# Patient Record
Sex: FEMALE | Race: WHITE | NOT HISPANIC OR LATINO | Employment: OTHER | URBAN - METROPOLITAN AREA
[De-identification: names, ages, dates, MRNs, and addresses within clinical notes are randomized per-mention and may not be internally consistent; named-entity substitution may affect disease eponyms.]

---

## 2018-03-27 ENCOUNTER — OFFICE VISIT (OUTPATIENT)
Dept: ORTHOPEDIC SURGERY | Facility: CLINIC | Age: 68
End: 2018-03-27

## 2018-03-27 VITALS
HEART RATE: 78 BPM | WEIGHT: 179.9 LBS | HEIGHT: 63 IN | BODY MASS INDEX: 31.88 KG/M2 | SYSTOLIC BLOOD PRESSURE: 149 MMHG | DIASTOLIC BLOOD PRESSURE: 74 MMHG

## 2018-03-27 DIAGNOSIS — M72.2 PLANTAR FASCIAL FIBROMATOSIS OF RIGHT FOOT: ICD-10-CM

## 2018-03-27 DIAGNOSIS — M72.2 PLANTAR FASCIITIS: Primary | ICD-10-CM

## 2018-03-27 PROCEDURE — 99203 OFFICE O/P NEW LOW 30 MIN: CPT | Performed by: ORTHOPAEDIC SURGERY

## 2018-03-27 RX ORDER — LEVOTHYROXINE SODIUM 112 UG/1
112 TABLET ORAL DAILY
COMMUNITY

## 2018-03-27 RX ORDER — SIMVASTATIN 40 MG
TABLET ORAL
COMMUNITY
Start: 2018-01-30 | End: 2023-02-21

## 2018-03-27 RX ORDER — COVID-19 ANTIGEN TEST
440 KIT MISCELLANEOUS
COMMUNITY
End: 2023-02-20

## 2018-03-27 NOTE — PROGRESS NOTES
NEW PATIENT    Patient: Renae Millna  : 1950    Primary Care Provider: Ender Dixon MD    Requesting Provider: As above    Pain of the Left Foot and Pain of the Right Foot      History    Chief Complaint: Bilateral heel pain, right forefoot pain    History of Present Illness: This is an extremely pleasant 68-year-old woman with a greater than one-year history of bilateral heel pain.  The heel pain is worse in the morning and on arising from a seated position and gets worse throughout the day.  She has tried some stretching, using a towel, she has not had a night splint.  She reports the left is worse than the right.  She reports the pain is 7-8 out of 10, sharp and aching.  She did have some acupuncture which helped for a few days.  A second problem is that she recently noted a nodule on the plantar surface of the right foot in the plantar fascia.  It was large initially but now is about 8 mm, it's tender to palpation, it's not as painful as it first appeared a month or so ago.  No history of injury.  She does not know if anyone in her family with similar nodules, she does not know if anyone with Dupuytren's.  She works as a bank  and patellar, has much more pain when she is on her feet all day.    No current outpatient prescriptions on file prior to visit.     No current facility-administered medications on file prior to visit.       Allergies   Allergen Reactions   • Latex Itching     Swelling        History reviewed. No pertinent past medical history.  Past Surgical History:   Procedure Laterality Date   • CERVICAL FUSION     • TUBAL ABDOMINAL LIGATION       Family History   Problem Relation Age of Onset   • Cancer Mother    • Diabetes Mother    • No Known Problems Father       Social History     Social History   • Marital status:      Spouse name: N/A   • Number of children: N/A   • Years of education: N/A     Occupational History   • Not on file.     Social History Main Topics   •  "Smoking status: Never Smoker   • Smokeless tobacco: Never Used   • Alcohol use No   • Drug use: No   • Sexual activity: Defer     Other Topics Concern   • Not on file     Social History Narrative   • No narrative on file        Review of Systems   Constitutional: Positive for fatigue.   HENT: Positive for tinnitus.    Eyes: Negative.    Respiratory: Positive for cough.    Cardiovascular: Negative.    Gastrointestinal: Negative.    Endocrine: Negative.    Genitourinary: Negative.    Musculoskeletal: Positive for arthralgias and back pain.   Skin: Negative.    Allergic/Immunologic: Negative.    Neurological: Positive for dizziness.   Hematological: Negative.    Psychiatric/Behavioral: Negative.        The following portions of the patient's history were reviewed and updated as appropriate: allergies, current medications, past family history, past medical history, past social history, past surgical history and problem list.    Physical Exam:   /74   Pulse 78   Ht 159.4 cm (62.75\")   Wt 81.6 kg (179 lb 14.3 oz)   BMI 32.12 kg/m²   GENERAL: Body habitus: overweight    Lower extremity edema: Left: none; Right: none    Varicose veins:  Left: mild; Right: mild    Gait: Antalgic with the first few steps     Mental Status:  awake and alert; oriented to person, place, and time    Voice:  clear  SKIN:  Normal and warm and dry    Hair Growth:  Right:normal; Left:  normal  NAILS: Toenails: Right great toenail is somewhat thickened and irregular, otherwise unremarkable  HEENT: Head: Normocephalic, atraumatic,  without obvious abnormality.  eye: normal external eye, no icterus  ears: normal external ears  nose: normal external nose  pharynx: dental hygiene adequate  PULM:  Repiratory effort normal  CV:  Dorsalis Pedis:  Right: 2+; Left:2+    Posterior Tibial: Right:2+; Left:2+    Capillary Refill:  Brisk  MSK:  Hand:left handed and mild arthritis, Heberden's nodes      Tibia:  Right:  non tender; Left:  non " "tender      Ankle:  Right: non tender, ROM  normal and symmetric and motor function  normal; Left:  non tender, ROM  normal and symmetric and motor function  normal      Foot:  Right:  She is very tender to palpation at the origin of the plantar fascia.  She also has a tender nodule that is in the plantar fascia, it's tender to palpation no other tenderness, normal range of motion; Left:  Very tender to palpation the origin the plantar fascia, no masses in the plantar fascia, otherwise nontender      NEURO: Heel Walking:  Right:  painful; Left:  painful    Toe Walking:  Right:  normal; Left:  normal     Du Quoin-Malachi 5.07 monofilament test: normal    Lower extremity sensation: intact     Reflexes:  Biceps:  Right:  1+; Left:  1+           Quads:  Right:  2+; Left:  2+           Ankle:  Right:  1+; Left:  1+      Calf Atrophy:none    Motor Function: all 5/5         Medical Decision Making    Data Review:   reviewed outside records    Assessment and Plan/ Diagnosis/Treatment options:   1. Plantar fasciitis  She definitely has plantar fasciitis bilaterally.  I explained plantar fasciitis in detail to her.  Diagnosis: plantar fasciitis:  I explained plantar fasciitis in detail to the patient.  I explained to the bow-string mechanism of the plantar fascia.  I went over the current research of the [employer] with them.  I explained the treatments that were not statistically significant in improving the problem including: injection of steroids, special orthotics, special shoes, surgery, medication, ice, not working, etc.    I explained the treatments that are statistically significant at improving the problem.  These include stretching/night splinting, castingI explained the most important treatment: Stretching and night splinting.  I went over the patient information sheet with them, I showed them the stretches and they were able to reproduce them.  I emphasized the \"toe pull\" as the " "most important stretch.  They need to do the stretches 5 repetitions each, 6-8 times per day.  I explained how to do them at work, at home, before getting out of bed, before getting out of the car, and before getting up from a chair.    We provided them with a night splint.  I recommend that she uses on alternating feet at night, I would not recommend 2 night splints    If they do not improve after 4 months of aggressive stretching and night splinting, the next step will be a short leg fiberglass walking cast worn for 6 weeks.    · Preprinted education was provided to the patient.  .  She also has some mild edema by report, we talked about support stockings    2. Plantar fascial fibromatosis of right foot  I explained plantar fibromatosis to the patient in detail.  It is a benign fibrous tumor in the plantar fascia.  It is best treated non-operatively as the recurrence rate after surgery is >50%.  It is more common in people of northern  descent and in diabetes.  It can be associated with  Dupuytren's in the palm, and Peyronie's in the male genitals.  It does not cause contracture in the foot.  It can be painful, especially in the developing phase.  However, the pain almost always resolves over time and it becomes a painless bump.      Custom orthotics with a \"dent\" in the area of the nodule help take the pressure off it.       I gave her prescription for custom orthotics, I emphasized that this will help take pressure off of fibroma, but will not cure plantar fasciitis.  Only stretching and the night splint will help the plantar fasciitis.                        "

## 2018-03-28 PROBLEM — M72.2 PLANTAR FASCIITIS: Status: ACTIVE | Noted: 2018-03-28

## 2018-03-28 PROBLEM — M72.2 PLANTAR FASCIAL FIBROMATOSIS OF RIGHT FOOT: Status: ACTIVE | Noted: 2018-03-28

## 2018-04-18 ENCOUNTER — HOSPITAL ENCOUNTER (OUTPATIENT)
Age: 68
End: 2018-04-18
Payer: COMMERCIAL

## 2018-04-18 DIAGNOSIS — M79.672: ICD-10-CM

## 2018-04-18 DIAGNOSIS — M79.671: Primary | ICD-10-CM

## 2018-04-19 ENCOUNTER — HOSPITAL ENCOUNTER (OUTPATIENT)
Age: 68
End: 2018-04-19
Payer: COMMERCIAL

## 2018-04-19 DIAGNOSIS — B35.1: ICD-10-CM

## 2018-04-19 DIAGNOSIS — M79.673: Primary | ICD-10-CM

## 2018-04-19 PROCEDURE — 87102 FUNGUS ISOLATION CULTURE: CPT

## 2018-04-19 PROCEDURE — 73630 X-RAY EXAM OF FOOT: CPT

## 2018-04-19 PROCEDURE — 87220 TISSUE EXAM FOR FUNGI: CPT

## 2018-04-19 PROCEDURE — 87206 SMEAR FLUORESCENT/ACID STAI: CPT

## 2018-07-25 ENCOUNTER — HOSPITAL ENCOUNTER (OUTPATIENT)
Age: 68
End: 2018-07-25
Payer: COMMERCIAL

## 2018-07-25 DIAGNOSIS — R10.84: Primary | ICD-10-CM

## 2018-07-25 LAB
BUN SERPL-MCNC: 9 MG/DL (ref 7–18)
CREAT BLD-SCNC: 0.78 MG/DL (ref 0.55–1.02)
ESTIMATED GLOMERULAR FILT RATE: 73 ML/MIN (ref 60–?)
GFR (AFRICAN AMERICAN): 89 ML/MIN (ref 60–?)

## 2018-07-25 PROCEDURE — 82565 ASSAY OF CREATININE: CPT

## 2018-07-25 PROCEDURE — 84520 ASSAY OF UREA NITROGEN: CPT

## 2018-07-25 PROCEDURE — 36415 COLL VENOUS BLD VENIPUNCTURE: CPT

## 2018-07-26 ENCOUNTER — HOSPITAL ENCOUNTER (OUTPATIENT)
Age: 68
End: 2018-07-26
Payer: COMMERCIAL

## 2018-07-26 DIAGNOSIS — R10.84: Primary | ICD-10-CM

## 2018-07-26 PROCEDURE — 74170 CT ABD WO CNTRST FLWD CNTRST: CPT

## 2018-09-19 ENCOUNTER — OFFICE VISIT (OUTPATIENT)
Dept: ORTHOPEDIC SURGERY | Facility: CLINIC | Age: 68
End: 2018-09-19

## 2018-09-19 VITALS — HEIGHT: 63 IN | OXYGEN SATURATION: 97 % | WEIGHT: 169.75 LBS | HEART RATE: 83 BPM | BODY MASS INDEX: 30.08 KG/M2

## 2018-09-19 DIAGNOSIS — M72.2 PLANTAR FASCIITIS: ICD-10-CM

## 2018-09-19 DIAGNOSIS — M72.2 PLANTAR FASCIAL FIBROMATOSIS OF RIGHT FOOT: Primary | ICD-10-CM

## 2018-09-19 PROCEDURE — 99212 OFFICE O/P EST SF 10 MIN: CPT | Performed by: ORTHOPAEDIC SURGERY

## 2018-09-19 NOTE — PROGRESS NOTES
"ESTABLISHED PATIENT    Patient: Renae Millan  : 1950    Primary Care Provider: Ender Dixon MD    Requesting Provider: As above    Follow-up (4 month f/u bilateral Plantar fasciitis)      History    Chief Complaint: bilateral heel pain, and plantar fibromatosis    History of Present Illness: she returns reporting her heel pain has resolved, only an occasional twinge in the morning.  She got the orthotics for the plantar fibromatosis.  She reports she had an xray recently and it showed a \"heel spur \" on the left, I again explained that the spur is not the source of pain.  The plantar fascia was the problem.      Current Outpatient Prescriptions on File Prior to Visit   Medication Sig Dispense Refill   • Conj Estrogens-Bazedoxifene (DUAVEE) 0.45-20 MG tablet Take  by mouth Every Other Day.     • levothyroxine (SYNTHROID, LEVOTHROID) 112 MCG tablet Take 112 mcg by mouth Daily.     • magnesium chloride ER (MAG-DELAY) 535 (64 Mg) MG CR tablet Take 128 mg by mouth Daily.     • Naproxen Sodium (ALEVE) 220 MG capsule Take 440 mg by mouth every night at bedtime.     • Probiotic Product (SOLUBLE FIBER/PROBIOTICS PO) Take  by mouth Daily.     • simvastatin (ZOCOR) 40 MG tablet        No current facility-administered medications on file prior to visit.       Allergies   Allergen Reactions   • Latex Itching     Swelling        History reviewed. No pertinent past medical history.  Past Surgical History:   Procedure Laterality Date   • CERVICAL FUSION     • TUBAL ABDOMINAL LIGATION       Family History   Problem Relation Age of Onset   • Cancer Mother    • Diabetes Mother    • No Known Problems Father       Social History     Social History   • Marital status:      Spouse name: N/A   • Number of children: N/A   • Years of education: N/A     Occupational History   • Not on file.     Social History Main Topics   • Smoking status: Never Smoker   • Smokeless tobacco: Never Used   • Alcohol use No   • Drug use: No   • " "Sexual activity: Defer     Other Topics Concern   • Not on file     Social History Narrative   • No narrative on file        Review of Systems   Constitutional: Negative.    HENT: Negative.    Eyes: Negative.    Respiratory: Negative.    Cardiovascular: Negative.    Gastrointestinal: Negative.    Endocrine: Negative.    Genitourinary: Negative.    Musculoskeletal: Positive for arthralgias (foot pain).   Skin: Negative.    Allergic/Immunologic: Negative.    Neurological: Negative.    Hematological: Negative.    Psychiatric/Behavioral: Negative.        The following portions of the patient's history were reviewed and updated as appropriate: allergies, current medications, past family history, past medical history, past social history, past surgical history and problem list.    Physical Exam:   Pulse 83   Ht 159.4 cm (62.76\")   Wt 77 kg (169 lb 12.1 oz)   SpO2 97%   BMI 30.30 kg/m²   GENERAL: Body habitus: overweight    Lower extremity edema: Left: none; Right: none    Gait: normal     Mental Status:  awake and alert; oriented to person, place, and time  MSK:  Tibia:  Right:  non tender; Left:  non tender        Ankle:  Right: non tender; Left:  non tender        Foot:  Right:  non tender, no change in small plantar fibroma; Left:  non tender    NEURO Sensation:  intact     Medical Decision Making    Data Review:   none    Assessment/Plan/Diagnosis/Treatment Options:   1. Plantar fascial fibromatosis of right foot  Not painful, she has the orthotics    2. Plantar fasciitis  Much improved, she should [id number] stretching, I will see her as needed                            "

## 2018-12-07 ENCOUNTER — LAB REQUISITION (OUTPATIENT)
Dept: LAB | Facility: HOSPITAL | Age: 68
End: 2018-12-07

## 2018-12-07 DIAGNOSIS — Z00.00 ROUTINE GENERAL MEDICAL EXAMINATION AT A HEALTH CARE FACILITY: ICD-10-CM

## 2018-12-07 PROCEDURE — 36415 COLL VENOUS BLD VENIPUNCTURE: CPT

## 2019-03-14 ENCOUNTER — LAB REQUISITION (OUTPATIENT)
Dept: LAB | Facility: HOSPITAL | Age: 69
End: 2019-03-14

## 2019-03-14 DIAGNOSIS — Z00.00 ROUTINE GENERAL MEDICAL EXAMINATION AT A HEALTH CARE FACILITY: ICD-10-CM

## 2019-03-14 PROCEDURE — 36415 COLL VENOUS BLD VENIPUNCTURE: CPT

## 2019-05-30 ENCOUNTER — HOSPITAL ENCOUNTER (OUTPATIENT)
Age: 69
End: 2019-05-30
Payer: COMMERCIAL

## 2019-05-30 DIAGNOSIS — Z85.820: ICD-10-CM

## 2019-05-30 DIAGNOSIS — R10.9: ICD-10-CM

## 2019-05-30 DIAGNOSIS — E04.1: ICD-10-CM

## 2019-05-30 DIAGNOSIS — R07.81: Primary | ICD-10-CM

## 2019-05-30 DIAGNOSIS — N63.0: ICD-10-CM

## 2019-05-30 DIAGNOSIS — D73.89: ICD-10-CM

## 2019-05-30 PROCEDURE — 71250 CT THORAX DX C-: CPT

## 2019-06-04 ENCOUNTER — HOSPITAL ENCOUNTER (OUTPATIENT)
Age: 69
End: 2019-06-04
Payer: COMMERCIAL

## 2019-06-04 DIAGNOSIS — R06.02: Primary | ICD-10-CM

## 2019-06-04 PROCEDURE — 71046 X-RAY EXAM CHEST 2 VIEWS: CPT

## 2019-06-17 ENCOUNTER — HOSPITAL ENCOUNTER (OUTPATIENT)
Age: 69
End: 2019-06-17
Payer: COMMERCIAL

## 2019-06-17 DIAGNOSIS — D73.9: Primary | ICD-10-CM

## 2019-06-17 PROCEDURE — 74183 MRI ABD W/O CNTR FLWD CNTR: CPT

## 2019-06-17 PROCEDURE — A9576 INJ PROHANCE MULTIPACK: HCPCS

## 2021-02-04 ENCOUNTER — HOSPITAL ENCOUNTER (OUTPATIENT)
Age: 71
End: 2021-02-04
Payer: COMMERCIAL

## 2021-02-04 DIAGNOSIS — R10.9: Primary | ICD-10-CM

## 2021-02-04 LAB
BUN SERPL-MCNC: 12 MG/DL (ref 7–17)
CREAT BLD-SCNC: 0.9 MG/DL (ref 0.52–1.04)
ESTIMATED GLOMERULAR FILT RATE: 62 ML/MIN (ref 60–?)
GFR (AFRICAN AMERICAN): 75 ML/MIN (ref 60–?)

## 2021-02-04 PROCEDURE — 82565 ASSAY OF CREATININE: CPT

## 2021-02-04 PROCEDURE — 36415 COLL VENOUS BLD VENIPUNCTURE: CPT

## 2021-02-04 PROCEDURE — 84520 ASSAY OF UREA NITROGEN: CPT

## 2021-02-05 ENCOUNTER — HOSPITAL ENCOUNTER (OUTPATIENT)
Age: 71
End: 2021-02-05
Payer: COMMERCIAL

## 2021-02-05 DIAGNOSIS — R10.9: Primary | ICD-10-CM

## 2021-02-05 PROCEDURE — 74177 CT ABD & PELVIS W/CONTRAST: CPT

## 2022-06-01 ENCOUNTER — HOSPITAL ENCOUNTER (OUTPATIENT)
Age: 72
End: 2022-06-01
Payer: MEDICARE

## 2022-06-01 DIAGNOSIS — M79.672: Primary | ICD-10-CM

## 2022-06-01 PROCEDURE — 73630 X-RAY EXAM OF FOOT: CPT

## 2022-06-27 ENCOUNTER — HOSPITAL ENCOUNTER (OUTPATIENT)
Dept: HOSPITAL 22 - OT | Age: 72
Discharge: HOME | End: 2022-06-27
Payer: MEDICARE

## 2022-06-27 DIAGNOSIS — G56.01: Primary | ICD-10-CM

## 2022-06-27 PROCEDURE — 97014 ELECTRIC STIMULATION THERAPY: CPT

## 2022-06-27 PROCEDURE — 97530 THERAPEUTIC ACTIVITIES: CPT

## 2022-06-27 PROCEDURE — 97010 HOT OR COLD PACKS THERAPY: CPT

## 2022-06-27 PROCEDURE — 97140 MANUAL THERAPY 1/> REGIONS: CPT

## 2022-06-27 PROCEDURE — 97110 THERAPEUTIC EXERCISES: CPT

## 2022-06-27 PROCEDURE — G0283 ELEC STIM OTHER THAN WOUND: HCPCS

## 2022-06-27 PROCEDURE — 97033 APP MDLTY 1+IONTPHRSIS EA 15: CPT

## 2022-06-27 PROCEDURE — 97035 APP MDLTY 1+ULTRASOUND EA 15: CPT

## 2022-06-27 PROCEDURE — 97166 OT EVAL MOD COMPLEX 45 MIN: CPT

## 2022-06-27 PROCEDURE — 97164 PT RE-EVAL EST PLAN CARE: CPT

## 2022-09-29 ENCOUNTER — HOSPITAL ENCOUNTER (OUTPATIENT)
Dept: HOSPITAL 22 - PT | Age: 72
Discharge: HOME | End: 2022-09-29
Payer: MEDICARE

## 2022-09-29 DIAGNOSIS — R42: Primary | ICD-10-CM

## 2022-09-29 PROCEDURE — 97163 PT EVAL HIGH COMPLEX 45 MIN: CPT

## 2022-09-29 PROCEDURE — 97530 THERAPEUTIC ACTIVITIES: CPT

## 2022-09-29 PROCEDURE — 97110 THERAPEUTIC EXERCISES: CPT

## 2022-09-29 PROCEDURE — 97112 NEUROMUSCULAR REEDUCATION: CPT

## 2022-09-29 PROCEDURE — 97140 MANUAL THERAPY 1/> REGIONS: CPT

## 2022-09-30 DIAGNOSIS — Z12.11 COLON CANCER SCREENING: Primary | ICD-10-CM

## 2022-10-03 ENCOUNTER — PRIOR AUTHORIZATION (OUTPATIENT)
Dept: GASTROENTEROLOGY | Facility: CLINIC | Age: 72
End: 2022-10-03

## 2022-10-20 ENCOUNTER — OUTSIDE FACILITY SERVICE (OUTPATIENT)
Dept: GASTROENTEROLOGY | Facility: CLINIC | Age: 72
End: 2022-10-20

## 2022-10-20 PROCEDURE — 45385 COLONOSCOPY W/LESION REMOVAL: CPT | Performed by: INTERNAL MEDICINE

## 2022-10-20 PROCEDURE — 88305 TISSUE EXAM BY PATHOLOGIST: CPT | Performed by: INTERNAL MEDICINE

## 2022-10-20 PROCEDURE — G0500 MOD SEDAT ENDO SERVICE >5YRS: HCPCS | Performed by: INTERNAL MEDICINE

## 2022-10-21 ENCOUNTER — LAB REQUISITION (OUTPATIENT)
Dept: LAB | Facility: HOSPITAL | Age: 72
End: 2022-10-21

## 2022-10-21 DIAGNOSIS — Z12.11 ENCOUNTER FOR SCREENING FOR MALIGNANT NEOPLASM OF COLON: ICD-10-CM

## 2022-10-24 ENCOUNTER — TELEPHONE (OUTPATIENT)
Dept: GASTROENTEROLOGY | Facility: CLINIC | Age: 72
End: 2022-10-24

## 2022-10-24 LAB
CYTO UR: NORMAL
LAB AP CASE REPORT: NORMAL
LAB AP CLINICAL INFORMATION: NORMAL
PATH REPORT.FINAL DX SPEC: NORMAL
PATH REPORT.GROSS SPEC: NORMAL

## 2022-10-24 NOTE — TELEPHONE ENCOUNTER
----- Message from Ke Evangelista MD sent at 10/24/2022 10:41 AM EDT -----  Please let Renae know she had an adenoma. Follow up in 5 years is recommended

## 2023-01-19 ENCOUNTER — HOSPITAL ENCOUNTER (OUTPATIENT)
Age: 73
End: 2023-01-19
Payer: MEDICARE

## 2023-01-19 DIAGNOSIS — G89.29: ICD-10-CM

## 2023-01-19 DIAGNOSIS — M54.41: ICD-10-CM

## 2023-01-19 DIAGNOSIS — M54.42: Primary | ICD-10-CM

## 2023-01-19 PROCEDURE — 72131 CT LUMBAR SPINE W/O DYE: CPT

## 2023-02-13 ENCOUNTER — HOSPITAL ENCOUNTER (OUTPATIENT)
Age: 73
End: 2023-02-13
Payer: MEDICARE

## 2023-02-13 DIAGNOSIS — M54.50: Primary | ICD-10-CM

## 2023-02-13 PROCEDURE — 76376 3D RENDER W/INTRP POSTPROCES: CPT

## 2023-02-13 PROCEDURE — 72110 X-RAY EXAM L-2 SPINE 4/>VWS: CPT

## 2023-02-13 PROCEDURE — 72148 MRI LUMBAR SPINE W/O DYE: CPT

## 2023-02-16 DIAGNOSIS — M43.16 SPONDYLOLISTHESIS OF LUMBAR REGION: Primary | ICD-10-CM

## 2023-02-17 DIAGNOSIS — M79.605 BILATERAL LOWER EXTREMITY PAIN: Primary | ICD-10-CM

## 2023-02-17 DIAGNOSIS — M79.604 BILATERAL LOWER EXTREMITY PAIN: Primary | ICD-10-CM

## 2023-02-20 ENCOUNTER — HOSPITAL ENCOUNTER (OUTPATIENT)
Dept: GENERAL RADIOLOGY | Facility: HOSPITAL | Age: 73
Discharge: HOME | End: 2023-02-20
Admitting: ANESTHESIOLOGY
Payer: MEDICARE

## 2023-02-20 DIAGNOSIS — M43.16 SPONDYLOLISTHESIS OF LUMBAR REGION: ICD-10-CM

## 2023-02-20 PROCEDURE — 72120 X-RAY BEND ONLY L-S SPINE: CPT

## 2023-02-20 RX ORDER — LEVOTHYROXINE SODIUM 112 UG/1
1 TABLET ORAL EVERY 24 HOURS
COMMUNITY
End: 2023-02-21

## 2023-02-20 RX ORDER — MECLIZINE HYDROCHLORIDE 25 MG/1
1 TABLET ORAL EVERY 8 HOURS
COMMUNITY

## 2023-02-20 RX ORDER — SIMVASTATIN 20 MG
TABLET ORAL
COMMUNITY
Start: 2023-02-07

## 2023-02-20 RX ORDER — MELOXICAM 15 MG/1
TABLET ORAL
COMMUNITY
Start: 2022-11-23

## 2023-02-20 RX ORDER — ESTRADIOL 10 UG/1
1 INSERT VAGINAL EVERY 24 HOURS
COMMUNITY

## 2023-02-20 RX ORDER — PANTOPRAZOLE SODIUM 20 MG/1
TABLET, DELAYED RELEASE ORAL
COMMUNITY
Start: 2023-02-07

## 2023-02-20 RX ORDER — MULTIPLE VITAMINS W/ MINERALS TAB 9MG-400MCG
TAB ORAL
COMMUNITY

## 2023-02-20 NOTE — PROGRESS NOTES
"Chief Complaint: \"Lower back pain and leg pain\"        History of Present Illness:   Patient: Ms. Renae Millan, 73 y.o. female   Referring Physician: Self-referral  Reason for Referral: Consultation for chronic intractable lower back and lower extremity pain pain.   Pain History: Patient reports a 6-month history of chronic progressive lower back and bilateral lower extremity pain, right worse than left, which began without incident. No preceding injury or trauma.  Pain has increased over the past 6 months. She was seen at the [facility] in October 2021 with complaints of polyarthralgia. Last year, she underwent consultation with Dr. Robin in rheumatology at the [facility] and diagnostic work-up for autoimmune disorder was unrevealing. She had a positive KIM in 2016. MRI of the lumbar spine without contrast on 2/13/2023 revealed grade 1 anterolisthesis of L3 on L4.  Diffuse lumbar spondylosis most significant from L3-4 through L5-S1 where there are disc bulges, facet hypertrophy, ligamentum flavum hypertrophy contributing to mild to moderate degree of canal and neuroforaminal stenosis, most significant at L3-L4 where there is grade 1 anterolisthesis, disc bulge, degenerative endplate changes, facet hypertrophy.  Left subarticular disc protrusion with mild canal stenosis and moderate lateral recess and left foraminal stenosis and mild right lateral recess and foraminal stenosis.  At L5-S1: Loss of disc height. Mild to moderate bilateral neuroforaminal stenosis, left greater than right. Lumbar x-rays AP, oblique, lateral views on 2/13/2023 revealed grade 1 anterior listhesis of L3 on L4.  Degenerative disc disease more pronounced from L3-4 through L5-S1.  At L5-S1, is most advanced with loss of disc height.  Facet hypertrophy. CT scan of the lumbar spine without contrast on 1/19/2023 revealed grade 1 anterolisthesis of L3 on L4.  Multilevel degenerative disc disease particularly from L3-L4 " through L5-S1 along with hypertrophy of the posterior elements.  Axial imaging: L3-L4: Disc bulge, moderate to severe central canal stenosis and mild to moderate bilateral neuroforaminal stenosis. At L5-S1: Loss of disc height with a broad-based disc osteophyte complex.  Mild canal stenosis.  Moderate bilateral neuroforaminal stenosis. Flexion-extension x-rays of the lumbar spine on 02/20/2023 revealed 6 mm anterolisthesis of L3 on L4 without instability during flexion and extension. Pain has progressed in intensity over the past months. Patient has failed to obtain pain relief with conservative measures for more than 6 months oral analgesics, heat, physical therapy (within the past 6 months), physical therapist directed home exercise program HEP (ongoing), to name a few  Pain Description: Constant lower back pain with intermittent exacerbation, described as aching, burning, dull, sharp, and throbbing sensation.   Radiation of Pain: The pain radiates into the posterior aspect of her thighs and calves and sometimes the dorsum of her feet RT>LT  Pain intensity today: 5/10   Average pain intensity last week: 6/10  Pain intensity ranges from: 4/10 to 8/10  Aggravating factors: Pain increases with squatting, standing, walking. Patient describes neurogenic claudication. Patient does not use a cane or walker  Alleviating factors: Pain decreases with sitting down on a recliner, changing positions  Associated Symptoms:   Patient reports pain, weakness but denies numbness right>left  lower extremities.   Patient denies any new bladder or bowel problems.   Patient reports difficulties with her balance but denies recent falls.   Pain interferes with general activities, and affects patient's quality of life  Pain interferes with sleep causing sleep fragmentation     Review of previous therapies and additional medical records:  Renae Millan has already failed the following measures, including:   Conservative Measures: Oral  analgesics, heat, physical therapy (within the past 6 months), physical therapist directed home exercise program HEP (ongoing),   Interventional Measures: None  Surgical Measures: ACDF Dr Levine 29 years ago. No history of previous lumbar spine or hip surgery  Renae Millan has not undergone orthopedic spine or neurosurgical consultation for chronic pain condition   Renae Millan presents with significant comorbidities including hypothyroidism, HLP  In terms of current analgesics, Renae Millan takes: Tylenol, naproxen  I have reviewed Scot Report consistent with medication reconciliation.  SOAPP: Low Risk     PHQ-2 Depression Screening  Little interest or pleasure in doing things? 0-->not at all   Feeling down, depressed, or hopeless? 0-->not at all   PHQ-2 Total Score 0   Patient screened negative for depression based on a PHQ-2 score of 0 on 02/21/2023    Global Pain Scale 02-21 2023          Pain 16          Feelings 9          Clinical outcomes 11          Activities 0          GPS Total: 36            The [place] Back Pain Disability Scale   DATE 02-21 2023          Sleep through the night 5          Turn over in bed 3          Get out of bed 0          Make your bed 0          Put on socks (pantyhose) 2          Ride in a car 2          Sit in a chair for several hours 5          Stand up for 20-30 minutes 5          Climb one flight of stairs 2          Walk a few blocks (200-300 yards)  3          Walk several miles 2          Run one block (about 50 yards) 5          Take food out of the refrigerator 0          Reach up to high shelves 2          Move a chair 2          Pull or push heavy doors 1          Bend over to clean the bathtub 5          Throw a ball 0          Carry two bags of groceries 0          Lift and carry a heavy suitcase 5          Total score 49            Review of Diagnostic Studies:  I have reviewed and interpreted the images with the patient and used the images and a tridimensional  spine model to explain findings. I have reviewed the report, as well.  Flexion-extension x-rays of the lumbar spine on 02/20/2023 revealed 6 mm anterolisthesis of L3 on L4 without instability during flexion and extension  MRI of the lumbar spine without contrast on 2/13/2023 revealed grade 1 anterolisthesis of L3 on L4.  Vertebral body heights and alignment otherwise are preserved.  The conus medullaris is seen at L1 and has unremarkable appearance.  Axial imaging:  L1-L2: No significant canal or foraminal stenosis  L2-L3: Facet hypertrophy.  No significant canal or foraminal stenosis  L3-L4: Grade 1 anterolisthesis, disc bulge, degenerative endplate changes, facet hypertrophy.  Left subarticular disc protrusion with mild canal stenosis and moderate lateral recess and left foraminal stenosis and mild right lateral recess and foraminal stenosis  L4-L5: Annular disc bulge. Facet hypertrophy.   No significant canal or foraminal stenosis.    L5-S1: Loss of disc height. Rightward broad-based disc osteophyte complex that narrows the right lateral recess, degenerative endplate changes, facet hypertrophy.  Mild to moderate bilateral neuroforaminal stenosis, left greater than right  Lumbar x-rays AP, oblique, lateral views on 2/13/2023 revealed grade 1 anterior listhesis of L3 on L4.  Degenerative disc disease more pronounced from L3-L4 through L5-S1.  At L5-S1, is most advanced with loss of disc height.  Facet hypertrophy  CT scan of the lumbar spine without contrast on 1/19/2023 revealed grade 1 anterolisthesis of L3 on L4.  Multilevel degenerative disc disease particularly from L3-L4 through L5-S1 along with hypertrophy of the posterior elements.  Axial imaging: L3-L4: Disc bulge, moderate to severe central canal stenosis and mild to moderate bilateral neuroforaminal stenosis  L4-L5: Disc bulge, facet hypertrophy.  Mild canal stenosis.  No significant foraminal stenosis  L5-S1: Loss of disc height with a rightward  broad-based disc osteophyte complex.  Mild canal stenosis.  Moderate bilateral neuroforaminal stenosis right greater than left    Review of Systems   Musculoskeletal: Positive for back pain.   Neurological: Positive for weakness.   All other systems reviewed and are negative.        Patient Active Problem List   Diagnosis   • Plantar fasciitis   • Plantar fascial fibromatosis of right foot   • Gait disturbance   • Physical deconditioning   • Degeneration of lumbar or lumbosacral intervertebral disc   • Spondylosis of lumbar region without myelopathy or radiculopathy   • Spondylolisthesis of lumbar region   • Lumbar stenosis with neurogenic claudication       Past Medical History:   Diagnosis Date   • Borderline high cholesterol    • Hypothyroidism    • Melanoma (HCC)     Removed from Left arm         Past Surgical History:   Procedure Laterality Date   • CERVICAL FUSION  06/1994    Dr. Chevy Peoples (C2,C3,C4)   • TUBAL ABDOMINAL LIGATION           Family History   Problem Relation Age of Onset   • Cancer Mother    • Diabetes Mother    • Diabetes Father    • Leukemia Father          Social History     Socioeconomic History   • Marital status:    Tobacco Use   • Smoking status: Never   • Smokeless tobacco: Never   Substance and Sexual Activity   • Alcohol use: No   • Drug use: No   • Sexual activity: Defer           Current Outpatient Medications:   •  Cholecalciferol 25 MCG (1000 UT) capsule, take 1 capsule by oral route  twice a week with Vitamin D3 2,000, Disp: , Rfl:   •  Cholecalciferol 50 MCG (2000 UT) capsule, Take 1 capsule by mouth Daily., Disp: , Rfl:   •  estradiol (VAGIFEM) 10 MCG tablet vaginal tablet, Insert 1 tablet into the vagina Daily., Disp: , Rfl:   •  levothyroxine (SYNTHROID, LEVOTHROID) 112 MCG tablet, Take 112 mcg by mouth Daily., Disp: , Rfl:   •  meclizine (ANTIVERT) 25 MG tablet, Take 1 tablet by mouth Every 8 (Eight) Hours., Disp: , Rfl:   •  meloxicam (MOBIC) 15 MG tablet, ,  "Disp: , Rfl:   •  multivitamin with minerals tablet tablet, Centrum Silver Women 8 mg iron-400 mcg-300 mcg tablet  Take by oral route., Disp: , Rfl:   •  pantoprazole (PROTONIX) 20 MG EC tablet, , Disp: , Rfl:   •  Probiotic Product (SOLUBLE FIBER/PROBIOTICS PO), Take  by mouth Daily., Disp: , Rfl:   •  simvastatin (ZOCOR) 20 MG tablet, , Disp: , Rfl:       Allergies   Allergen Reactions   • Hycodan [Hydrocodone Bit-Homatrop Mbr] Shortness Of Breath   • Latex Swelling     Pt reports experienced \"years ago\".           /86   Pulse 85   Temp 95.9 °F (35.5 °C)   Ht 157.5 cm (62\")   Wt 72.9 kg (160 lb 12.8 oz)   SpO2 96%   BMI 29.41 kg/m²       Physical Exam:  Constitutional: Patient appears well-developed, well-nourished, well-hydrated, appears younger than stated age  HEENT: Head: Normocephalic and atraumatic  Eyes: Conjunctivae and lids are normal  Pupils: Equal, round, reactive to light  Peripheral vascular exam: Posterior tibialis: right 2+ and left 2+. Dorsalis pedis: right 2+ and left 2+. No edema.   Musculoskeletal   Gait and station: Gait evaluation demonstrated a normal gait with slight shuffling   Lumbar spine: Passive and active range of motion are almost full and without pain. Extension, flexion, lateral flexion, rotation of the lumbar spine did not increase or reproduce pain. Lumbar facet joint loading maneuvers are negative.   Kobe test and Gaenslen's test are negative   Piriformis maneuvers are negative   Hip joints: The range of motion of the hip joints is almost full and without pain   Palpation of the bilateral greater trochanter, unrevealing   Examination of the Iliotibial band: unrevealing   Neurological:   Patient is alert and oriented to person, place, and time.   Speech: Normal.   Cortical function: Normal mental status.   Reflex Scores:  Right patellar: 2+  Left patellar: 2+  Right Achilles: 1+  Left Achilles: 1+  Motor strength: 5/5  Motor Tone: Normal  Involuntary movements: " None.   Superficial/Primitive Reflexes: Primitive reflexes were absent.   Right Mobley: Absent  Left Mobley: Absent  Right ankle clonus: Absent  Left ankle clonus: Absent   Babinsky: Absent  Long tract signs: negative. SLR: + at 40 degrees RT>LT . Femoral stretch sign: Negative.   Sensory exam: Intact to light touch, intact pain and temperature sensation, intact vibration sensation and normal proprioception.   Coordination: Normal finger to nose and heel to shin. Normal balance and negative Romberg's sign   Skin and subcutaneous tissue: Skin is warm and intact. No rash noted. No cyanosis.   Psychiatric: Judgment and insight: Normal. Recent and remote memory: Intact. Mood and affect: Normal.     Renae Millan   1950    ASSESSMENT:   1. Lumbar stenosis with neurogenic claudication    2. Spondylolisthesis of lumbar region    3. Spondylosis of lumbar region without myelopathy or radiculopathy    4. Degeneration of lumbar or lumbosacral intervertebral disc    5. Physical deconditioning    6. Gait disturbance        PLAN/MEDICAL DECISION MAKING:  Ms. Renae Millan, 73 y.o. female  presents with a 6-month history of chronic progressive lower back and bilateral lower extremity pain, right worse than left, which began without incident. MRI of the lumbar spine without contrast on 02/13/2023 revealed grade 1 anterolisthesis of L3 on L4. Diffuse lumbar spondylosis most significant from L3-L4 through L5-S1 with disc bulges, facet hypertrophy, ligamentum flavum hypertrophy contributing to mild to moderate degree of canal, lateral recess and neuroforaminal stenosis, most significant at L3-L4 where there is grade 1 anterolisthesis, disc bulge, degenerative endplate changes, facet hypertrophy. Left subarticular disc protrusion with mild canal stenosis and moderate lateral recess and left foraminal stenosis and mild right lateral recess and foraminal stenosis.  At L5-S1: Severe loss of disc height with rightward broad-based disc  osteophyte complex that narrows the right lateral recess, degenerative endplate changes, facet hypertrophy. Lateral recess stenosis RT>LT and moderate bilateral neuroforaminal stenosis, left greater than right. Flexion-extension x-rays of the lumbar spine on 02/20/2023 revealed 6 mm anterolisthesis of L3 on L4 without instability during flexion and extension. Pain has progressed in intensity over the past months. Patient has failed to obtain pain relief with conservative measures for more than 6 months oral analgesics, heat, physical therapy (within the past 6 months), physical therapist directed home exercise program HEP (ongoing), to name a few.  Patient reports symptoms consistent with lumbar stenosis with radiculopathy likely at L5-S1 with correlation on MRI findings on physical examination. A comprehensive evaluation including history and physical exam along with pertinent physiologic and functional assessment was performed. Patient presents with intractable pain due to the diagnoses listed above. Patient has failed to respond to conservative modalities, as referenced under HPI including the impact of patient's moderate-to-severe pain contributing to significant impairment in daily activities, ADLs, and a negative impact on quality of life. Supporting diagnostic studies of patient's chronic pain condition have been reviewed. I have reviewed all available patient's medical records as well as previous therapies as referenced above. I had a lengthy conversation with Ms. Renae Millan regarding her chronic pain condition and potential therapeutic options including risks, benefits, alternative therapies, to name a few. We have discussed using a stepwise approach starting with the shortest or least intense level of treatment, care, or service as determined by the extent required to diagnose and or treat patient's condition. The treatments proposed are consistent with the patient's medical condition and are known to be  as safe and effective by current guidelines and standard of care. There is no evidence of absolute contraindications for the proposed procedures under the current circumstances. These treatments are not considered experimental or investigational. The duration and frequency proposed are considered appropriate for the service in accordance with accepted standards of medical practice for the diagnosis and or treatment of the patient's condition and or intended to improve the patient's level of function. These services will be furnished in a setting appropriate to the patient's medical needs and condition. Therefore, I have proposed the following plan:  1. Interventional pain management measures:  Patient will be scheduled for diagnostic and therapeutic bilateral L5-S1 transforaminal epidural steroid injections with the addition of hyaluronidase using the lowest effective dose of steroids, under C-arm fluoroscopic guidance, with the use of contrast dye (unless contraindicated) to confirm appropriate needle placement and spread of contrast dye. We may repeat therapeutic bilateral L5-S1 transforaminal epidural steroid injections with the addition of hyaluronidase depending on patient's outcome.  As per current guidelines, epidurals will be limited to a maximum of 4 sessions per spinal region in a rolling twelve (12) month period. Continuation of epidural steroid injections over 12 months would only be considered under the following provisions;  • Patient is a high-risk surgical candidate, or the patient does not desire surgery, or recurrence of pain in the same location relieved with ESIs for at least three months and epidural provides at least 50% sustained improvement of pain and/or 50% objective improvement in function (using same scale as baseline)  • Pain is severe enough to cause a significant degree of functional disability or vocational disability  • The primary care provider will be notified regarding  continuation of procedures and repeat steroid use   Otherwise, we will consider epidurals at the L4 level, diagnostic and therapeutic bilateral L3-L4 transforaminals, or otherwise, obtain lumbar myelogram followed by CT postmyelogram and facilitate a referral to neurosurgery.  2. Diagnostic studies:  A. EMG/NCV of the lower extremities (ordered)  B. Discussed lumbar myelogram followed by CT postmyelogram  3. Pharmacological measures: Reviewed and discussed;   A. Patient takes  Tylenol, naproxen  B. Trial with gabapentin 100 mg start QHS for 3 days, then if tolerated BID x 3 days, then, if tolerated continue TID, #90, 1 refill  C. Trial with nortriptyline 10 mg 1-2 tablets at bedtime (after starting titration with gabapentin), #60, 1 refill  D. Trial with Rheumate one tablet once daily  E. Start pyridoxine 100 mg one tablet by mouth daily take for 30 days, #30, no refills  F. Start alpha lipoid acid 2488-4296 mg per day divided into 3 doses  G. Trial with prilocaine 2%, lidocaine 10%, cyclobenzaprine 4%, capsaicin 0.001% and mannitol 20% cream, apply 1 to 2 grams of cream to the affected areas every 4 to 6 hours as needed  Screening and compliance with controlled substances:  Patient has completed a SOAPP and ORT questionnaires (revealing low risk). Scot report has been reviewed and appropriate. Patient has signed a consent for treatment with controlled substances after reviewing the document and verbalizing full understanding of the risks, benefits, alternatives, and consequences of compliance and adherence with treatment and comprehensive plan of care. Patient received in hand a copy of this document.  4. Long-term rehabilitation efforts:  A. The patient does not have a history of falls. I did complete a risk assessment for falls.   B. Patient will start a comprehensive physical therapy program for water therapy, Alter-G, core strengthening, gait and balance training, neurodynamics, E-STIM, myofascial release,  cupping, dry needling, home exercise program  C. Start an exercise program such as water therapy  D. Contrast therapy: Apply ice-packs for 15-20 minutes, followed by heating pads for 15-20 minutes to affected area   E. Renae Millan  reports that she has never smoked. She has never used smokeless tobacco.   5. The patient has been instructed to contact my office with any questions or difficulties. The patient understands the plan and agrees to proceed accordingly.    The patient has a documented plan of care to address chronic pain. Renae Millan reports a pain score of  5/10.  Given her pain assessment as noted, treatment options were discussed and the following options were decided upon as a follow-up plan to address the patient's pain: continuation of current treatment plan for pain, educational materials on pain management, home exercises and therapy, prescription for non-opiod analgesics, referral to Physical Therapy, referral to specialist for assistance in pain treatment guidance, steroid injections, use of non-medical modalities (ice, heat, stretching and/or behavior modifications) and Interventional pain management measures.            Pain Management Panel    There is no flowsheet data to display.        LUIS E query complete. LUIS E reviewed by Ziggy Moscoso MD.     Pain Medications             meloxicam (MOBIC) 15 MG tablet          Please note that portions of this note were completed with a voice recognition program.     Ziggy Moscoso MD    Patient Care Team:  Babak Dixon MD as PCP - General (Family Medicine)  Ziggy Moscoso MD as Consulting Physician (Pain Medicine)     No orders of the defined types were placed in this encounter.        Future Appointments   Date Time Provider Department Center   3/1/2023  8:30 AM Ziggy Moscoso MD [facility]   4/19/2023  9:30 AM  [facility]  [facility]

## 2023-02-21 ENCOUNTER — OFFICE VISIT (OUTPATIENT)
Dept: PAIN MEDICINE | Facility: CLINIC | Age: 73
End: 2023-02-21
Payer: MEDICARE

## 2023-02-21 VITALS
WEIGHT: 160.8 LBS | HEIGHT: 62 IN | DIASTOLIC BLOOD PRESSURE: 86 MMHG | TEMPERATURE: 95.9 F | SYSTOLIC BLOOD PRESSURE: 148 MMHG | HEART RATE: 85 BPM | BODY MASS INDEX: 29.59 KG/M2 | OXYGEN SATURATION: 96 %

## 2023-02-21 DIAGNOSIS — R26.9 GAIT DISTURBANCE: ICD-10-CM

## 2023-02-21 DIAGNOSIS — R53.81 PHYSICAL DECONDITIONING: ICD-10-CM

## 2023-02-21 DIAGNOSIS — M48.062 LUMBAR STENOSIS WITH NEUROGENIC CLAUDICATION: ICD-10-CM

## 2023-02-21 DIAGNOSIS — M48.062 LUMBAR STENOSIS WITH NEUROGENIC CLAUDICATION: Primary | ICD-10-CM

## 2023-02-21 DIAGNOSIS — M43.16 SPONDYLOLISTHESIS OF LUMBAR REGION: ICD-10-CM

## 2023-02-21 DIAGNOSIS — M47.816 SPONDYLOSIS OF LUMBAR REGION WITHOUT MYELOPATHY OR RADICULOPATHY: ICD-10-CM

## 2023-02-21 DIAGNOSIS — M51.37 DEGENERATION OF LUMBAR OR LUMBOSACRAL INTERVERTEBRAL DISC: ICD-10-CM

## 2023-02-21 PROBLEM — M51.379 DEGENERATION OF LUMBAR OR LUMBOSACRAL INTERVERTEBRAL DISC: Status: ACTIVE | Noted: 2023-02-21

## 2023-02-21 PROCEDURE — 99204 OFFICE O/P NEW MOD 45 MIN: CPT | Performed by: ANESTHESIOLOGY

## 2023-02-22 DIAGNOSIS — M48.062 LUMBAR STENOSIS WITH NEUROGENIC CLAUDICATION: ICD-10-CM

## 2023-02-22 RX ORDER — ST. JOHN'S WORT 300 MG
400 CAPSULE ORAL 3 TIMES DAILY
Qty: 180 CAPSULE | Refills: 5 | Status: SHIPPED | OUTPATIENT
Start: 2023-02-22 | End: 2023-04-05 | Stop reason: SDUPTHER

## 2023-02-22 RX ORDER — NORTRIPTYLINE HYDROCHLORIDE 10 MG/1
CAPSULE ORAL
Qty: 60 CAPSULE | Refills: 1 | Status: SHIPPED | OUTPATIENT
Start: 2023-02-22 | End: 2023-04-05 | Stop reason: SDUPTHER

## 2023-02-22 RX ORDER — ME-TETRAHYDROFOLATE/B12/HRB236 1-1-500 MG
1 CAPSULE ORAL DAILY
Qty: 90 CAPSULE | Refills: 1 | Status: SHIPPED | OUTPATIENT
Start: 2023-02-22 | End: 2023-04-05 | Stop reason: SDUPTHER

## 2023-02-22 RX ORDER — GABAPENTIN 100 MG/1
CAPSULE ORAL
Qty: 90 CAPSULE | Refills: 1 | Status: SHIPPED | OUTPATIENT
Start: 2023-02-22 | End: 2023-04-05 | Stop reason: SDUPTHER

## 2023-02-22 RX ORDER — MULTIVITAMIN WITH IRON
100 TABLET ORAL DAILY
Qty: 30 TABLET | Refills: 0 | Status: SHIPPED | OUTPATIENT
Start: 2023-02-22

## 2023-03-01 ENCOUNTER — OUTSIDE FACILITY SERVICE (OUTPATIENT)
Dept: PAIN MEDICINE | Facility: CLINIC | Age: 73
End: 2023-03-01
Payer: MEDICARE

## 2023-03-01 PROCEDURE — 99152 MOD SED SAME PHYS/QHP 5/>YRS: CPT | Performed by: ANESTHESIOLOGY

## 2023-03-01 PROCEDURE — 64483 NJX AA&/STRD TFRM EPI L/S 1: CPT | Performed by: ANESTHESIOLOGY

## 2023-03-03 ENCOUNTER — TELEPHONE (OUTPATIENT)
Dept: PAIN MEDICINE | Facility: CLINIC | Age: 73
End: 2023-03-03
Payer: MEDICARE

## 2023-03-03 NOTE — TELEPHONE ENCOUNTER
FOLLOW-UP CALL AFTER PROCEDURE  Spoke with pt regarding how they are feeling after Wednesday's procedure with Dr. Moscoso. Patient advises that they are doing well.   Pain level before procedure: 9/10   Pain level after procedure: 0/10  Patient reports that the pain relief lasting hours. (pain level 0/10)   Patient denies side effects or complications  Patient does not have any questions or concerns at this time.  Advised pt doesn't have a follow up scheduled at this time, to give us a call as needed.

## 2023-03-30 ENCOUNTER — HOSPITAL ENCOUNTER (OUTPATIENT)
Dept: HOSPITAL 22 - PT | Age: 73
Discharge: HOME | End: 2023-03-30
Payer: MEDICARE

## 2023-03-30 DIAGNOSIS — M48.062: Primary | ICD-10-CM

## 2023-03-30 PROCEDURE — 97010 HOT OR COLD PACKS THERAPY: CPT

## 2023-03-30 PROCEDURE — 97164 PT RE-EVAL EST PLAN CARE: CPT

## 2023-03-30 PROCEDURE — 97014 ELECTRIC STIMULATION THERAPY: CPT

## 2023-03-30 PROCEDURE — 97163 PT EVAL HIGH COMPLEX 45 MIN: CPT

## 2023-03-30 PROCEDURE — 97113 AQUATIC THERAPY/EXERCISES: CPT

## 2023-03-30 PROCEDURE — 97140 MANUAL THERAPY 1/> REGIONS: CPT

## 2023-03-30 PROCEDURE — G0283 ELEC STIM OTHER THAN WOUND: HCPCS

## 2023-03-30 PROCEDURE — 97110 THERAPEUTIC EXERCISES: CPT

## 2023-04-05 DIAGNOSIS — M48.062 LUMBAR STENOSIS WITH NEUROGENIC CLAUDICATION: ICD-10-CM

## 2023-04-05 RX ORDER — ME-TETRAHYDROFOLATE/B12/HRB236 1-1-500 MG
1 CAPSULE ORAL DAILY
Qty: 90 CAPSULE | Refills: 1 | Status: SHIPPED | OUTPATIENT
Start: 2023-04-05

## 2023-04-05 RX ORDER — NORTRIPTYLINE HYDROCHLORIDE 10 MG/1
CAPSULE ORAL
Qty: 60 CAPSULE | Refills: 1 | Status: SHIPPED | OUTPATIENT
Start: 2023-04-05

## 2023-04-05 RX ORDER — GABAPENTIN 100 MG/1
CAPSULE ORAL
Qty: 90 CAPSULE | Refills: 1 | Status: SHIPPED | OUTPATIENT
Start: 2023-04-05

## 2023-04-05 RX ORDER — ST. JOHN'S WORT 300 MG
400 CAPSULE ORAL 3 TIMES DAILY
Qty: 180 CAPSULE | Refills: 5 | Status: SHIPPED | OUTPATIENT
Start: 2023-04-05

## 2023-04-19 ENCOUNTER — HOSPITAL ENCOUNTER (OUTPATIENT)
Dept: NEUROLOGY | Facility: HOSPITAL | Age: 73
Discharge: HOME | End: 2023-04-19
Admitting: ANESTHESIOLOGY
Payer: MEDICARE

## 2023-04-19 PROCEDURE — 95886 MUSC TEST DONE W/N TEST COMP: CPT

## 2023-04-19 PROCEDURE — 95910 NRV CNDJ TEST 7-8 STUDIES: CPT

## 2023-07-08 PROBLEM — M16.0 BILATERAL PRIMARY OSTEOARTHRITIS OF HIP: Status: ACTIVE | Noted: 2023-07-08

## 2023-07-18 PROBLEM — M54.16 LUMBAR RADICULOPATHY: Status: ACTIVE | Noted: 2023-07-18

## 2023-07-24 ENCOUNTER — OUTSIDE FACILITY SERVICE (OUTPATIENT)
Dept: PAIN MEDICINE | Facility: CLINIC | Age: 73
End: 2023-07-24
Payer: MEDICARE

## 2023-07-24 PROCEDURE — 99152 MOD SED SAME PHYS/QHP 5/>YRS: CPT | Performed by: ANESTHESIOLOGY

## 2023-07-24 PROCEDURE — 64483 NJX AA&/STRD TFRM EPI L/S 1: CPT | Performed by: ANESTHESIOLOGY

## 2023-07-31 ENCOUNTER — TELEPHONE (OUTPATIENT)
Dept: PAIN MEDICINE | Facility: CLINIC | Age: 73
End: 2023-07-31
Payer: MEDICARE

## 2023-08-21 DIAGNOSIS — M48.062 LUMBAR STENOSIS WITH NEUROGENIC CLAUDICATION: ICD-10-CM

## 2023-08-21 RX ORDER — GABAPENTIN 100 MG/1
CAPSULE ORAL
Qty: 180 CAPSULE | Refills: 1 | Status: SHIPPED | OUTPATIENT
Start: 2023-08-21

## 2023-08-25 ENCOUNTER — HOSPITAL ENCOUNTER (OUTPATIENT)
Dept: MRI IMAGING | Facility: HOSPITAL | Age: 73
Discharge: HOME | End: 2023-08-25
Admitting: ANESTHESIOLOGY
Payer: MEDICARE

## 2023-08-25 DIAGNOSIS — Z01.818 PRE-PROCEDURAL EXAMINATION: ICD-10-CM

## 2023-08-25 PROCEDURE — 72146 MRI CHEST SPINE W/O DYE: CPT

## 2023-10-28 DIAGNOSIS — M48.062 LUMBAR STENOSIS WITH NEUROGENIC CLAUDICATION: ICD-10-CM

## 2023-10-30 RX ORDER — GABAPENTIN 100 MG/1
CAPSULE ORAL
Qty: 180 CAPSULE | Refills: 1 | Status: SHIPPED | OUTPATIENT
Start: 2023-10-30

## 2023-11-01 NOTE — PROGRESS NOTES
"Chief Complaint: \"I did well after the injection. I don't have pain on my left side. My right side hurts from my back to my ankle. I don't have trouble walking. I have trouble climbing steps up worse than down.\"      History of Present Illness: Ms. Renae Millan, 73 y.o. female with a history of chronic intractable lower back and lower extremity pain. Renae Millan was last seen on 07/24/2023, when she underwent diagnotic and therapeutic bilateral L3-L4 transforaminal epidural steroid injections with the addition of hyaluronidase from which she has experienced 100% ongoing pain relief on the left side and initially the same relief on the right that extended for several weeks with progressive recurrence. She still displays remarkable functional improvement as she denies neurogenic claudication. She takes Tylenol PRN, naproxen PRN. She continues on gabapentin 100 mg 2 tablets TID, nortriptyline 10 mg 1-2 tablets at bedtime, Rheumate, alpha lipoid acid 3808-9376 mg per day divided into 3 doses, and uses prilocaine 2%, lidocaine 10%, cyclobenzaprine 4%, capsaicin 0.001% and mannitol 20% cream, apply 1 to 2 grams of cream to the affected areas every 4 to 6 hours as needed. She denies side effects from her medications. She completed a physical therapy program in the past and continued a HEP based on water therapy. She continues on a daily HEP. She denies significant changes in her medical history.    Pain History: Renae Millan reports a greater than 1 year history of chronic progressive lower back and bilateral lower extremity pain, right worse than left, which began without incident. She had a positive KIM in 2016. She was seen at the [facility] in October 2021 with complaints of polyarthralgia. Last year, she underwent consultation with Dr. Robin in rheumatology at the [facility] and underwent diagnostic work-up for autoimmune disorders that was unrevealing. Renae Millan underwent bilateral hip " X-rays on 07/05/2023 that revealed mild hip and SI OA. EMG/NCV on 04/19/2023 was unrevealing. Flexion and extension x-rays of the lumbar spine on 02/20/2023, revealed 6 mm anterolisthesis of L3 on L4 without instability during flexion and extension. MRI of the lumbar spine without contrast on 02/13/2023 revealed diffuse lumbar spondylosis most significant from L3-L4 through L5-S1 with multilevel disc bulges, lumbar facet hypertrophy, ligamentum flavum hypertrophy contributing to mild to moderate degree of canal and neuroforaminal stenosis. At L3-L4: Grade 1 anterolisthesis, disc bulge, degenerative endplate changes, facet hypertrophy, severe ligamentum flavum hypertrophy contributing to moderate canal stenosis, moderate lateral recess stenosis and and moderate left and mild right foraminal stenosis.  At L5-S1: Loss of disc height. Mild to moderate bilateral neuroforaminal stenosis, left greater than right.  Since her last visit, she underwent MRI of the thoracic spine w/o contrast on 08/25/2023 revealed no evidence of significant spondylosis or spinal canal or neural foramina stenosis. Patient previously failed to obtain pain relief with conservative measures for more than 6 months oral analgesics, heat, physical therapy, physical therapist directed home exercise program HEP (ongoing), to name a few. Patient experienced remarkable analgesic and functional improvement after diagnotic and therapeutic bilateral L5-S1 transforaminal epidural steroid injections with the addition of hyaluronidase performed on 03/01/2023, and more recently from diagnotic and therapeutic bilateral L3-L4 transforaminal epidural steroid injections with the addition of hyaluronidase on 07/24/2023.  Pain Description: Constant pain with intermittent exacerbation, described as aching, dull, and throbbing sensation.   Radiation of Pain: The pain radiates from the right lumbar lumbar region to the posterior right hip down globally into the right  lower extremity to her right ankle  Pain intensity today: 3/10 (down from 7/10)   Average pain intensity last week: 5/10 (down from 8/10)  Pain intensity ranges from: 3 (down from 7)/10 to 8/10 (down from 9)  Aggravating factors: Pain increases when climbing steps. Patient denies neurogenic claudication at this time. Patient does not use a cane or walker  Alleviating factors: Pain decreases with with sitting down on a recliner, lying flat on her back  Associated Symptoms:   Patient reports pain and weakness but denies numbness or tingling in her right lower extremity. Patient denies symptoms in the opposite limb  Patient denies any new bladder or bowel problems.   Patient reports difficulties with her balance but denies recent falls.   Pain interferes with general activities (climbing stairs at this time), and affects patient's quality of life  Pain no longer interferes with sleep  Muscle spasms: No  Stiffness: No    Review of previous therapies and additional medical records:  Renae Millan has already failed the following measures, including:   Conservative Measures: Oral analgesics, heat, physical therapy (within the past 3 months), physical therapist directed home exercise program HEP (ongoing)  Interventional Measures:   07/24/2023: Diagnotic and therapeutic bilateral L3-L4 transforaminal epidural steroid injections with the addition of hyaluronidase   03/01/2023: Diagnotic and therapeutic bilateral L5-S1 transforaminal epidural steroid injections with the addition of hyaluronidase   Surgical Measures: ACDF Dr Levine 29 years ago.  No history of previous lumbar spine or hip surgery   Renae Millan has not undergone orthopedic spine or neurosurgical consultation for her chronic pain condition   Renae Millan presents with significant comorbidities including hypothyroidism, HLP  In terms of current analgesics, Renae Millan takes: Tylenol PRN, naproxen PRN. She continues on gabapentin 100 mg 2 tablets TID,  nortriptyline 10 mg 1-2 tablets at bedtime, Rheumate, alpha lipoid acid 3434-9577 mg per day divided into 3 doses, and uses prilocaine 2%, lidocaine 10%, cyclobenzaprine 4%, capsaicin 0.001% and mannitol 20% cream, apply 1 to 2 grams of cream to the affected areas every 4 to 6 hours as needed  I have reviewed Csot Report consistent with medication reconciliation.  SOAPP/ORT: Low Risk     PHQ-2 Depression Screening  Little interest or pleasure in doing things? 0-->not at all   Feeling down, depressed, or hopeless? 0-->not at all   PHQ-2 Total Score 0     Pain Self-Efficacy Questionnaire (PSEQ)  ITEM 07-18 2023 11-02 2023           I can enjoy things despite the pain. 5  5           I can do most of the household chores (tidying up, washing dishes, etc), despite the pain. 5  5           I can socialize with my friends or family members as often as I used to do, despite the pain. 5  6           I can cope with my pain in most situations. 4  6           I can do some form of work, despite the pain (includes housework, paid, and unpaid work). 5  6           I can still do many of the things I enjoy doing, such as hobbies or leisure activity despite pain. 6  6           I can cope with my pain without medications. 4  4           I can accomplish most of my goals in life despite the pain. 4  6           I can live in a normal lifestyle, despite the pain. 6  6           I can gradually become more active, despite the pain. 5  6           TOTAL SCORE 49  56           49/60     Global Pain Scale 02-21 2023 07-18 2023 11-02 2023              Pain 16 17  12              Feelings 9 5  1             Clinical outcomes 11 5  1             Activities 0 23  1             GPS Total: 36 50  15                The [place] Back Pain Disability Scale   DATE 02-21 2023 07-18 2023 11-02 2023             Sleep through the night 5 3  2             Turn over in bed 3 2  2             Get out of bed 0 0  1             Make your bed 0 0   0             Put on socks (pantyhose) 2 0  2             Ride in a car 2 2  2             Sit in a chair for several hours 5 2  4             Stand up for 20-30 minutes 5 0  0             Climb one flight of stairs 2 2  4             Walk a few blocks (200-300 yards)  3 2  0             Walk several miles 2 2  0             Run one block (about 50 yards) 5 4  5             Take food out of the refrigerator 0 0  0             Reach up to high shelves 2 0  0             Move a chair 2 0  0             Pull or push heavy doors 1 0  0             Bend over to clean the bathtub 5 2  2             Throw a ball 0 0  0             Carry two bags of groceries 0 0  0             Lift and carry a heavy suitcase 5 0  1             Total score 49 21  25                  Review of New Diagnostic Studies:  I have independently reviewed and interpreted the images with the patient and used the images and a tridimensional spine model to explain findings. I have also reviewed the reports.  MRI of the thoracic spine w/o contrast on 08/25/2023 revealed no evidence of significant spondylosis or spinal canal or neural foramina stenosis.      Review of Previous Diagnostic Studies:    I have reviewed and interpreted the images with the patient and used the images and a tridimensional spine model to explain findings. I have reviewed the report, as well.  Bilateral hip X-rays 07/05/2023: Mild hip and SI OA  EMG/NCV 04/19/2023: Normal study of back and legs  Flexion-extension x-rays of the lumbar spine on 02/20/2023 revealed 6 mm anterolisthesis of L3 on L4 without instability during flexion and extension  MRI of the lumbar spine without contrast on 2/13/2023 revealed grade 1 anterolisthesis of L3 on L4.  Vertebral body heights and alignment otherwise are preserved.  The conus medullaris is seen at L1 and has unremarkable appearance.  Axial imaging:  L1-L2: No significant canal or foraminal stenosis  L2-L3: Facet hypertrophy.  No significant  canal or foraminal stenosis  L3-L4: Grade 1 anterolisthesis, disc bulge, degenerative endplate changes, facet hypertrophy, severe ligamentum flavum hypertrophy contributing to moderate canal stenosis, moderate lateral recess, and moderate left and mild right foraminal stenosis   L4-L5: Annular disc bulge. Facet hypertrophy.  Ligamentum flavum hypertrophy (>5 mm). Mild canal stenosis. No significant foraminal stenosis.    L5-S1: Loss of disc height. Rightward broad-based disc osteophyte complex that narrows the right lateral recess, degenerative endplate changes, facet hypertrophy.  Mild to moderate bilateral neuroforaminal stenosis, left greater than right  Lumbar x-rays AP, oblique, lateral views on 2/13/2023 revealed grade 1 anterior listhesis of L3 on L4.  Degenerative disc disease more pronounced from L3-L4 through L5-S1.  At L5-S1, is most advanced with loss of disc height.  Facet hypertrophy  CT scan of the lumbar spine without contrast on 1/19/2023 revealed grade 1 anterolisthesis of L3 on L4.  Multilevel degenerative disc disease particularly from L3-L4 through L5-S1 along with hypertrophy of the posterior elements.  Axial imaging: L3-L4: Disc bulge, moderate to severe central canal stenosis and mild to moderate bilateral neuroforaminal stenosis  L4-L5: Disc bulge, facet hypertrophy.  Mild canal stenosis.  No significant foraminal stenosis  L5-S1: Loss of disc height with a rightward broad-based disc osteophyte complex.  Mild canal stenosis.  Moderate bilateral neuroforaminal stenosis right greater than left    The following portions of the patient's history were reviewed and updated as appropriate: problem list, past medical history, past surgery history, social history, family history, medication reconciliation, and allergies    Review of Systems   Musculoskeletal:  Positive for arthralgias and back pain.   All other systems reviewed and are negative.        Patient Active Problem List   Diagnosis     Plantar fasciitis    Plantar fascial fibromatosis of right foot    Gait disturbance    Physical deconditioning    Degeneration of lumbar or lumbosacral intervertebral disc    Spondylosis of lumbar region without myelopathy or radiculopathy    Spondylolisthesis of lumbar region    Lumbar stenosis with neurogenic claudication    Bilateral primary osteoarthritis of hip    Lumbar radiculopathy       Past Medical History:   Diagnosis Date    Borderline high cholesterol     Hypothyroidism     Melanoma     Removed from Left arm         Past Surgical History:   Procedure Laterality Date    CERVICAL FUSION  06/1994    Dr. Chevy Peoples (C2,C3,C4)    TUBAL ABDOMINAL LIGATION           Family History   Problem Relation Age of Onset    Cancer Mother     Diabetes Mother     Diabetes Father     Leukemia Father          Social History     Socioeconomic History    Marital status:    Tobacco Use    Smoking status: Never    Smokeless tobacco: Never   Substance and Sexual Activity    Alcohol use: No    Drug use: No    Sexual activity: Defer           Current Outpatient Medications:     Alpha Lipoic Acid 200 MG capsule, Take 400 mg by mouth 3 (Three) Times a Day., Disp: 180 capsule, Rfl: 5    Cholecalciferol 25 MCG (1000 UT) capsule, take 1 capsule by oral route  twice a week with Vitamin D3 2,000, Disp: , Rfl:     Cholecalciferol 50 MCG (2000 UT) capsule, Take 1 capsule by mouth Daily., Disp: , Rfl:     Dietary Management Product (Rheumate) capsule, Take 1 capsule by mouth Daily., Disp: 90 capsule, Rfl: 1    estradiol (VAGIFEM) 10 MCG tablet vaginal tablet, Insert 1 tablet into the vagina Daily., Disp: , Rfl:     fluconazole (DIFLUCAN) 200 MG tablet, Take 1 tablet by mouth Daily., Disp: , Rfl:     gabapentin (NEURONTIN) 100 MG capsule, TAKE TWO CAPSULES BY MOUTH THREE TIMES A DAY, Disp: 180 capsule, Rfl: 1    Gel Base gel, 2 g 4 (Four) Times a Day. prilocaine 2%, lidocaine 10%, cyclobenzaprine 4%, capsaicin 0.001%,  "Meloxicam 0.1% and mannitol 20% cream, Disp: 30 g, Rfl: PRN    levothyroxine (SYNTHROID, LEVOTHROID) 112 MCG tablet, Take 1 tablet by mouth Daily., Disp: , Rfl:     meclizine (ANTIVERT) 25 MG tablet, Take 1 tablet by mouth Every 8 (Eight) Hours., Disp: , Rfl:     meloxicam (MOBIC) 15 MG tablet, , Disp: , Rfl:     multivitamin with minerals tablet tablet, Centrum Silver Women 8 mg iron-400 mcg-300 mcg tablet  Take by oral route., Disp: , Rfl:     nitrofurantoin, macrocrystal-monohydrate, (MACROBID) 100 MG capsule, TAKE 1 CAPSULE BY MOUTH EVERY 12 HOURS WITH FOOD FOR 10 DAYS, Disp: , Rfl:     nortriptyline (PAMELOR) 10 MG capsule, 1-2 tablets at bedtime, Disp: 60 capsule, Rfl: 3    pantoprazole (PROTONIX) 20 MG EC tablet, , Disp: , Rfl:     Probiotic Product (SOLUBLE FIBER/PROBIOTICS PO), Take  by mouth Daily., Disp: , Rfl:     simvastatin (ZOCOR) 20 MG tablet, , Disp: , Rfl:       Allergies   Allergen Reactions    Hycodan [Hydrocodone Bit-Homatrop Mbr] Shortness Of Breath    Latex Swelling     Pt reports experienced \"years ago\".           Ht 157.5 cm (62\")   Wt 72.4 kg (159 lb 9.6 oz)   BMI 29.19 kg/m²       Physical Exam:  Findings of today's exam, 11/02/2023:  Constitutional: Patient appears well-developed, well-nourished, well-hydrated, appears younger than stated age  HEENT: Head: Normocephalic and atraumatic  Eyes: Conjunctivae and lids are normal  Pupils: Equal, round, reactive to light  Peripheral vascular exam: Posterior tibialis: right 2+ and left 2+. Dorsalis pedis: right 2+ and left 2+. No edema.   Musculoskeletal   Gait and station: Gait evaluation demonstrated an antalgic gait (limping on right leg), able to walk on heels, toes, and tandem   Lumbar spine: Passive and active range of motion are almost full and without pain. Extension, flexion, lateral flexion, rotation of the lumbar spine did not increase or reproduce pain. Lumbar facet joint loading maneuvers are negative.   Kobe test and Gaenslen's " test: Negative   Piriformis maneuvers: Negative   Hip joints: The range of motion of the hip joints is almost full and without pain   Palpation of the bilateral greater trochanter: Unrevealing   Examination of the Iliotibial band: Unrevealing   Neurological:   Patient is alert and oriented to person, place, and time.   Speech: Normal.   Cortical function: Normal mental status.   Reflex Scores:  Right patellar: 0+   Left patellar: 1+  Right Achilles: 1+  Left Achilles: 1+  Motor strength: 5/5  Motor Tone: Normal  Involuntary movements: None.   Superficial/Primitive Reflexes: Primitive reflexes were absent.   Right Mobley: Absent  Left Mobley: Absent  Right ankle clonus: Absent  Left ankle clonus: Absent   Babinsky: Absent  Long tract signs: Negative. SLR: Negative. Femoral stretch sign: Negative.   Sensory exam: Intact to light touch, intact pain and temperature sensation, intact vibration sensation and normal proprioception  Coordination: Normal finger to nose and heel to shin. Balance: Normal  Romberg's sign: Negative    Skin and subcutaneous tissue: Skin is warm and intact. No rash noted. No cyanosis.   Psychiatric: Judgment and insight: Normal. Recent and remote memory: Intact. Mood and affect: Normal.     ASSESSMENT:   1. Lumbar stenosis with neurogenic claudication    2. Lumbar radiculopathy    3. Ligamentum flavum hypertrophy    4. Spondylolisthesis of lumbar region    5. Degeneration of lumbar or lumbosacral intervertebral disc    6. Bilateral primary osteoarthritis of hip    7. Physical deconditioning    8. Gait disturbance      PLAN/MEDICAL DECISION MAKING:  Ms. Renae Millan, 73 y.o. female presents with a history of chronic intractable lower back and lower extremity pain. Renae Millan reports a greater than 1 year history of chronic progressive lower back and bilateral lower extremity pain, right worse than left, which began without incident. She had a positive KIM in 2016. She was seen at the [facility] in October 2021 with complaints of polyarthralgia. Last year, she underwent consultation with Dr. Robin in rheumatology at the [facility] and underwent diagnostic work-up for autoimmune disorders that was unrevealing. Renae Millan underwent bilateral hip X-rays on 07/05/2023 that revealed mild hip and SI OA. EMG/NCV on 04/19/2023 was unrevealing. Flexion and extension x-rays of the lumbar spine on 02/20/2023, revealed 6 mm anterolisthesis of L3 on L4 without instability during flexion and extension. MRI of the lumbar spine without contrast on 02/13/2023 revealed diffuse lumbar spondylosis most significant from L3-L4 through L5-S1 with multilevel disc bulges, lumbar facet hypertrophy, ligamentum flavum hypertrophy contributing to mild to moderate degree of canal and neuroforaminal stenosis. At L3-L4: Grade 1 anterolisthesis, disc bulge, degenerative endplate changes, facet hypertrophy, severe ligamentum flavum hypertrophy contributing to moderate canal stenosis, moderate lateral recess stenosis and and moderate left and mild right foraminal stenosis.  At L5-S1: Loss of disc height. Mild to moderate bilateral neuroforaminal stenosis, left greater than right.  Since her last visit, she underwent MRI of the thoracic spine w/o contrast on 08/25/2023 revealed no evidence of significant spondylosis or spinal canal or neural foramina stenosis. Patient previously failed to obtain pain relief with conservative measures for more than 6 months oral analgesics, heat, physical therapy, physical therapist directed home exercise program HEP (ongoing), to name a few. Renae Millan was last seen on 07/24/2023, when she underwent diagnotic and therapeutic bilateral L3-L4 transforaminal epidural steroid injections with the addition of hyaluronidase from which she has experienced 100% ongoing pain relief on the left side and initially the same relief on the right that extended for several weeks with progressive  recurrence. She still displays remarkable functional improvement as she denies neurogenic claudication. Her main issue is right lower extremity pain that increases when climbing steps. She denies groin or hip pain. She takes Tylenol PRN, naproxen PRN. She continues on gabapentin 100 mg 2 tablets TID, nortriptyline 10 mg 1-2 tablets at bedtime, Rheumate, alpha lipoid acid 8604-3444 mg per day divided into 3 doses, and uses prilocaine 2%, lidocaine 10%, cyclobenzaprine 4%, capsaicin 0.001% and mannitol 20% cream, apply 1 to 2 grams of cream to the affected areas every 4 to 6 hours as needed. She has completed a physical therapy program in the past and continues a daily HEP based on water therapy. She denies significant changes in her medical history. Patient experienced remarkable analgesic and functional improvement after diagnostic and therapeutic bilateral L5-S1 transforaminal epidural steroid injections with the addition of hyaluronidase performed on 03/01/2023, and more recently from diagnostic and therapeutic bilateral L3-L4 transforaminal epidural steroid injections with the addition of hyaluronidase on 07/24/2023. A comprehensive evaluation including history and physical exam along with pertinent physiologic and functional assessment was performed. Patient presents with intractable pain due to the diagnoses listed above. Patient has failed to respond to conservative modalities, as referenced under HPI. Patient has responded well to minimally invasive interventional pain management measures, as referenced from previous notes and under HPI. I have documented the impact of patient's moderate-to-severe pain contributing to significant impairment in daily activities, ADLs, and a negative impact on the patient's quality of life, as reflected on Global Pain Scale 15/100 (down from 50); The [place] Back Pain Disability Scale 25/100 (up from 21);Tinetti Gait & Balance Assessment Tool  (low risk for falls). I have  reviewed pertinent supporting diagnostic studies of patient's chronic pain condition as well as all available pertinent medical records to patient's chronic pain condition including previous therapies, as referenced above. PHQ-2 Depression Screening 0; Pain Self-Efficacy Questionnaire (PSEQ) 56/60 (up from 49). I had a lengthy conversation with Ms. Renae Millan regarding her chronic pain condition and potential therapeutic options including risks, benefits, alternative therapies, to name a few. We have discussed using a stepwise approach starting with the least intense level of care as determined by the extent required to diagnose and or treat a patient's condition. The proposed treatments are consistent with the patient's medical condition and known to be safe and effective by current guidelines and the standard of care. The duration and frequency proposed are considered appropriate for the service in accordance with accepted standards of medical practice for the diagnosis and treatment of the patient's condition and intended to improve the patient's level of function. These services will be furnished in a setting appropriate to the patient's medical needs and condition. Therefore, I have proposed the following plan:    1. Interventional pain management measures: Patient does not take blood thinners. Renae Millan will be scheduled for diagnostic and therapeutic right L3-L4 and right L4-L5 transforaminal epidural steroid injections with the addition of hyaluronidase using the lowest effective dose of steroids, under C-arm fluoroscopic guidance, with the use of contrast dye to confirm appropriate needle placement and spread of contrast dye. We may repeat therapeutic transforaminal epidural steroid injections depending on patient's outcome and following current guidelines: Epidurals will be limited to a maximum of 4 sessions per spinal region in a rolling twelve (12) month period. Continuation of epidural steroid  injections over 12 months would only be considered under the following provisions;  Patient is a high-risk surgical candidate, or the patient does not desire surgery, or recurrence of pain in the same location relieved with ESIs for at least three months and epidural provides at least 50% sustained improvement of pain and/or 50% objective improvement in function (using same scale as baseline)  Pain is severe enough to cause a significant degree of functional disability or vocational disability  The primary care provider will be notified regarding continuation of procedures and repeat steroid use   Otherwise, we may proceed with MILD bilaterally at L3-L4 and L4-L5, versus Vertiflex versus PNS Vs SCS Vs lumbar myelogram followed by CT postmyelogram and repeat EMG/NCV to facilitate a referral to neurosurgery .    2. Diagnostic studies:   A. Random UDS with gabapentin  B. CBC, PT, PTT prior to MILD  C. I have discussed prospects of lumbar myelogram followed by CT postmyelogram    3. Pharmacological measures: Reviewed and discussed;   A. Continue Tylenol PRN, naproxen PRN  B. Increase gabapentin to 3 tablets = 300 mg TID. When she is due for a refill, we will send a new Rx for gabapentin 300 mg TID  C. Continue nortriptyline 10 mg 1-2 tablets at bedtime (after starting titration with gabapentin), #60, 2 refills  D. Continue Rheumate one tablet once daily  E. Continue alpha lipoid acid 8035-8711 mg per day divided into 3 doses  F.  Continue prilocaine 2%, lidocaine 10%, cyclobenzaprine 4%, capsaicin 0.001% and mannitol 20% cream, apply 1 to 2 grams of cream to the affected areas every 4 to 6 hours as needed  Screening and compliance with controlled substances:  Patient has completed a SOAPP and ORT questionnaires (revealing low risk). Scot report has been reviewed and appropriate. Patient has signed a consent for treatment with controlled substances after reviewing the document and verbalizing full understanding of the  risks, benefits, alternatives, and consequences of compliance and adherence with treatment and comprehensive plan of care. Patient received in hand a copy of this document.     4. Long-term rehabilitation efforts:  A. The patient does not have a history of falls. Also, I performed a risk assessment for falls using the Tinetti gait & balance assessment tool (scored low risk for falls).   B. Patient will start a comprehensive physical therapy program for water therapy, Alter-G, core strengthening, gait and balance training, neurodynamics, E-STIM, myofascial release, cupping, dry needling, home exercise program  C. Contrast therapy: Apply ice-packs for 15-20 minutes, followed by heating pads for 15-20 minutes to affected area   D. Start a low impact exercise program such as water therapy, swimming, yoga  E. I have prescribed a home exercise program with instructions. I have spent a significant amount of time talking with the patient and providing specific recommendations regarding lifestyle modification, preventive measures, and self-care management of chronic pain.  In addition, I have provided a copy of the booklet Care of the back by Max Knowles MD and Coral Benitez MD to be used as a physician supervised home exercise program  F.  Renae Milaln  reports that she has never smoked. She has never used smokeless tobacco.    5. The patient has been instructed to contact my office with any questions or difficulties. The patient understands the plan and agrees to proceed accordingly.    The patient has a documented plan of care to address chronic pain. Renae Millan reports a pain score of 5/10.  Given her pain assessment as noted, treatment options were discussed and the following options were decided upon as a follow-up plan to address the patient's pain: continuation of current treatment plan for pain, educational materials on pain management, home exercises and therapy, prescription for non-opiod analgesics, referral  to Physical Therapy, referral to specialist for assistance in pain treatment guidance, steroid injections, use of non-medical modalities (ice, heat, stretching and/or behavior modifications), and interventional pain management measures .               Pain Management Panel           No data to display                 LUIS E query complete. LUIS E reviewed by Ziggy Moscoso MD.     Pain Medications               gabapentin (NEURONTIN) 100 MG capsule TAKE TWO CAPSULES BY MOUTH THREE TIMES A DAY    meloxicam (MOBIC) 15 MG tablet     nortriptyline (PAMELOR) 10 MG capsule 1-2 tablets at bedtime             No orders of the defined types were placed in this encounter.       Please note that portions of this note were completed with a voice recognition program.   Any copied data in any portion of my note has been reviewed by myself and accurate.     The 21st Century Cures Act makes medical notes like this available to patients in the interest of transparency. This is a medical document intended as peer to peer communication. It is written in medical language and may contain abbreviations or verbiage that are unfamiliar. It may appear blunt or direct. Medical documents are intended to carry relevant information, facts as evident, and the clinical opinion of the practitioner.     Ziggy Moscoso MD    Patient Care Team:  Babak Dixon MD as PCP - General (Family Medicine)  Ziggy Moscoso MD as Consulting Physician (Pain Medicine)     No orders of the defined types were placed in this encounter.        No future appointments.

## 2023-11-02 ENCOUNTER — OFFICE VISIT (OUTPATIENT)
Dept: PAIN MEDICINE | Facility: CLINIC | Age: 73
End: 2023-11-02
Payer: MEDICARE

## 2023-11-02 VITALS — WEIGHT: 159.6 LBS | HEIGHT: 62 IN | BODY MASS INDEX: 29.37 KG/M2

## 2023-11-02 DIAGNOSIS — M24.28 LIGAMENTUM FLAVUM HYPERTROPHY: ICD-10-CM

## 2023-11-02 DIAGNOSIS — M48.062 LUMBAR STENOSIS WITH NEUROGENIC CLAUDICATION: ICD-10-CM

## 2023-11-02 DIAGNOSIS — M16.0 BILATERAL PRIMARY OSTEOARTHRITIS OF HIP: ICD-10-CM

## 2023-11-02 DIAGNOSIS — R53.81 PHYSICAL DECONDITIONING: ICD-10-CM

## 2023-11-02 DIAGNOSIS — M43.16 SPONDYLOLISTHESIS OF LUMBAR REGION: ICD-10-CM

## 2023-11-02 DIAGNOSIS — M51.37 DEGENERATION OF LUMBAR OR LUMBOSACRAL INTERVERTEBRAL DISC: ICD-10-CM

## 2023-11-02 DIAGNOSIS — M48.062 LUMBAR STENOSIS WITH NEUROGENIC CLAUDICATION: Primary | ICD-10-CM

## 2023-11-02 DIAGNOSIS — M54.16 LUMBAR RADICULOPATHY: ICD-10-CM

## 2023-11-02 DIAGNOSIS — R26.9 GAIT DISTURBANCE: ICD-10-CM

## 2023-11-02 PROCEDURE — 1160F RVW MEDS BY RX/DR IN RCRD: CPT | Performed by: ANESTHESIOLOGY

## 2023-11-02 PROCEDURE — 99214 OFFICE O/P EST MOD 30 MIN: CPT | Performed by: ANESTHESIOLOGY

## 2023-11-02 PROCEDURE — 1159F MED LIST DOCD IN RCRD: CPT | Performed by: ANESTHESIOLOGY

## 2023-11-02 PROCEDURE — 1125F AMNT PAIN NOTED PAIN PRSNT: CPT | Performed by: ANESTHESIOLOGY

## 2023-11-02 RX ORDER — NITROFURANTOIN 25; 75 MG/1; MG/1
CAPSULE ORAL
COMMUNITY
Start: 2023-10-31

## 2023-11-02 RX ORDER — FLUCONAZOLE 200 MG/1
1 TABLET ORAL DAILY
COMMUNITY
Start: 2023-10-31

## 2023-11-02 RX ORDER — ME-TETRAHYDROFOLATE/B12/HRB236 1-1-500 MG
1 CAPSULE ORAL DAILY
Qty: 90 CAPSULE | Refills: 2 | Status: SHIPPED | OUTPATIENT
Start: 2023-11-02

## 2023-11-08 ENCOUNTER — OUTSIDE FACILITY SERVICE (OUTPATIENT)
Dept: PAIN MEDICINE | Facility: CLINIC | Age: 73
End: 2023-11-08
Payer: MEDICARE

## 2023-11-08 PROCEDURE — 64484 NJX AA&/STRD TFRM EPI L/S EA: CPT | Performed by: ANESTHESIOLOGY

## 2023-11-08 PROCEDURE — 99152 MOD SED SAME PHYS/QHP 5/>YRS: CPT | Performed by: ANESTHESIOLOGY

## 2023-11-08 PROCEDURE — 64483 NJX AA&/STRD TFRM EPI L/S 1: CPT | Performed by: ANESTHESIOLOGY

## 2023-11-25 DIAGNOSIS — M48.062 LUMBAR STENOSIS WITH NEUROGENIC CLAUDICATION: ICD-10-CM

## 2023-11-27 RX ORDER — GABAPENTIN 100 MG/1
CAPSULE ORAL
Qty: 180 CAPSULE | Refills: 0 | Status: SHIPPED | OUTPATIENT
Start: 2023-11-27

## 2023-11-27 NOTE — TELEPHONE ENCOUNTER
Caller: Monty Renae    Relationship to patient: Self    Best call back number: [phone] (home)     Patient is needing: MS RASHID STATED THAT THE LAST TIME SHE SAW DR QUEVEDO THEY TALKED ABOUT INCREASING HER GABAPENTIN FROM 2 TABLETS 3X A DAY TO 3 TABLETS 3X A DAY. BUT THE RX STILL STATED 2 TABLETS 3X PER DAY. SHE WOULD LIKE THE RX TO BE UPDATED AND SENT BACK TO [facility] PLEASE.

## 2024-02-01 ENCOUNTER — OFFICE VISIT (OUTPATIENT)
Dept: PAIN MEDICINE | Facility: CLINIC | Age: 74
End: 2024-02-01
Payer: MEDICARE

## 2024-02-01 VITALS — HEIGHT: 62 IN | BODY MASS INDEX: 29.44 KG/M2 | WEIGHT: 160 LBS

## 2024-02-01 DIAGNOSIS — M43.16 SPONDYLOLISTHESIS OF LUMBAR REGION: ICD-10-CM

## 2024-02-01 DIAGNOSIS — M48.062 LUMBAR STENOSIS WITH NEUROGENIC CLAUDICATION: ICD-10-CM

## 2024-02-01 DIAGNOSIS — M24.28 LIGAMENTUM FLAVUM HYPERTROPHY: ICD-10-CM

## 2024-02-01 DIAGNOSIS — Z51.81 THERAPEUTIC DRUG MONITORING: ICD-10-CM

## 2024-02-01 DIAGNOSIS — Z01.818 PRE-OP TESTING: ICD-10-CM

## 2024-02-01 DIAGNOSIS — M16.0 BILATERAL PRIMARY OSTEOARTHRITIS OF HIP: ICD-10-CM

## 2024-02-01 DIAGNOSIS — R26.9 GAIT DISTURBANCE: ICD-10-CM

## 2024-02-01 DIAGNOSIS — M51.37 DEGENERATION OF LUMBAR OR LUMBOSACRAL INTERVERTEBRAL DISC: ICD-10-CM

## 2024-02-01 DIAGNOSIS — M48.062 LUMBAR STENOSIS WITH NEUROGENIC CLAUDICATION: Primary | ICD-10-CM

## 2024-02-01 DIAGNOSIS — M47.816 SPONDYLOSIS OF LUMBAR REGION WITHOUT MYELOPATHY OR RADICULOPATHY: ICD-10-CM

## 2024-02-01 DIAGNOSIS — R53.81 PHYSICAL DECONDITIONING: ICD-10-CM

## 2024-02-01 DIAGNOSIS — M54.16 LUMBAR RADICULOPATHY: ICD-10-CM

## 2024-02-01 PROCEDURE — 1125F AMNT PAIN NOTED PAIN PRSNT: CPT | Performed by: ANESTHESIOLOGY

## 2024-02-01 PROCEDURE — 1159F MED LIST DOCD IN RCRD: CPT | Performed by: ANESTHESIOLOGY

## 2024-02-01 PROCEDURE — 99214 OFFICE O/P EST MOD 30 MIN: CPT | Performed by: ANESTHESIOLOGY

## 2024-02-01 PROCEDURE — 1160F RVW MEDS BY RX/DR IN RCRD: CPT | Performed by: ANESTHESIOLOGY

## 2024-02-01 RX ORDER — GABAPENTIN 300 MG/1
300 CAPSULE ORAL 3 TIMES DAILY
Qty: 90 CAPSULE | Refills: 2 | Status: SHIPPED | OUTPATIENT
Start: 2024-02-01

## 2024-02-01 RX ORDER — NORTRIPTYLINE HYDROCHLORIDE 25 MG/1
25 CAPSULE ORAL NIGHTLY
Qty: 60 CAPSULE | Refills: 3 | Status: SHIPPED | OUTPATIENT
Start: 2024-02-01

## 2024-02-01 NOTE — PROGRESS NOTES
"Chief Complaint: \"I did well after the procedure. I'm having pain in my right hip and below my knees.\"        History of Present Illness:  Ms. Renae Millan, 73 y.o. female has a history of chronic intractable lower back and lower extremity pain, which began without incident. Renae Millan was last seen on 11/08/2024, when she underwent diagnostic and therapeutic right L3-L4 and right L4-L5 transforaminal epidural steroid injections with the addition of hyaluronidase from which she has experienced 50% pain relief and functional improvement including resolution of her neurogenic claudication that lasted for about 10 weeks with progressive recurrence. She complains of right hip pain, and for the past week, she started experiencing pain in her legs from her knee down.  Renae Millan continues on Tylenol PRN, naproxen PRN, gabapentin 100 mg 2 tablets TID, nortriptyline 10 mg 1-2 tablets at bedtime, Rheumate QD, alpha lipoid acid 6120-4473 mg per day divided into 3 doses, and prilocaine 2%, lidocaine 10%, capsaicin 0.001%, cyclobenzaprine 4%, mannitol 20% cream, 1 to 2 grams of cream to the affected areas every 4 to 6 hours as needed. She denies side effects from her medications. She completed a physical therapy program and continues a HEP based on water therapy.   Pain History: Renae Millan reports a 2-year history of chronic progressive lower back and bilateral lower extremity pain, right worse than left, which began without incident. 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 She was evaluated at the [facility] in October 2021 with complaints of polyarthralgia. In 2022, she was evaluated by Dr. Robin, rheumatology, [facility]. She underwent a complete diagnostic work-up for autoimmune disorders that as per patient was unrevealing. In addition, she has completed the following diagnostic studies for work up of her chronic lower back pain:  MRI of the lumbar spine without contrast on 02/13/2023: Diffuse " lumbar spondylosis most significant from L3-L4 through L5-S1 with multilevel disc bulges, lumbar facet hypertrophy, ligamentum flavum hypertrophy contributing to moderate degrees of canal, lateral recess, and neuroforaminal stenosis. L3-L4: Grade 1 anterolisthesis, disc bulge, degenerative endplate changes, facet hypertrophy, severe ligamentum flavum hypertrophy contributing to moderate to severe canal stenosis, moderate lateral recess stenosis and moderate left and mild right foraminal stenosis. At L4-L5: Annular disc bulge, facet hypertrophy, ligamentum flavum hypertrophy (>5 mm) contributing to moderate canal stenosis. No significant foraminal stenosis.  At L5-S1: Loss of disc height. Mild to moderate bilateral neuroforaminal stenosis, left greater than right.    Flexion and extension x-rays of the lumbar spine on 02/20/2023, 6 mm anterolisthesis of L3 on L4 without instability during flexion and extension.   MRI of the thoracic spine w/o contrast on 08/25/2023 revealed no evidence of significant spinal canal or neural foramina stenosis.   Bilateral hip X-rays on 07/05/2023: Mild hip and SI OA.   EMG/NCV on 04/19/2023: Unrevealing.   Renae Millan failed to obtain pain relief with conservative measures including oral analgesics, topical analgesics, ice, heat, TENs, physical therapy, physical therapist directed home exercise program HEP (ongoing), therapeutic massage, independent exercise program (ongoing), to name a few. Renae Millan experienced remarkable analgesic and functional improvement after diagnostic and therapeutic bilateral L5-S1 transforaminal epidural steroid injections with the addition of hyaluronidase on 03/01/2023, diagnostic and therapeutic bilateral L3-L4 transforaminal epidural steroid injections with the addition of hyaluronidase on 07/24/2023, and more recently, on 11/08/2024, after diagnostic and therapeutic right L3-L4 and right L4-L5 transforaminal epidural steroid injections with the  addition of hyaluronidase. Patient denies significant changes in her medical history. Pain has progressed in intensity over the past weeks.  Pain Description: Constant lower back and right hip pain with intermittent exacerbation, described as aching, dull, and throbbing sensation.   Radiation of Pain: The pain radiates into the right hip and right anterior thigh  Pain intensity today: 5/10 (down from 7/10)   Average pain intensity last week: 4/10  (down from 8/10)  Pain intensity ranges from: 4/10 to 8/10  (down from 7/10 to 9/10)  Aggravating factors: Pain increases with when climbing steps (improved since last visit). Patient reports improvement of her neurogenic claudication. Patient does not use a cane or walker  Alleviating factors: Pain decreases with sitting down, sitting on a recliner, lying down flat on her back  Associated Symptoms:   Patient reports pain and weakness in the lower extremities (from the knees down). She denies numbness  Patient denies any new bladder or bowel problems.   Patient reports difficulties with her balance but denies recent falls.   Pain interferes with general activities (climbing stairs), and affects patient's quality of life  Pain interferes with sleep: falling asleep and causing sleep fragmentation   Muscle spasms: No  Stiffness: No    Review of previous therapies and additional medical records:  Renae Millan has already failed the following measures, including:   Conservative Measures: Oral analgesics, topical analgesics, ice, heat, TENs, physical therapy, physical therapist directed home exercise program HEP (ongoing), therapeutic massage, independent exercise program (ongoing)  Interventional Measures: None  11/08/2024: Diagnostic and therapeutic right L3-L4 and right L4-L5 transforaminal epidural steroid injections with the addition of hyaluronidase   07/24/2023: Diagnostic and therapeutic bilateral L3-L4 transforaminal epidural steroid injections with the addition of  hyaluronidase   03/01/2023: Diagnostic and therapeutic bilateral L5-S1 transforaminal epidural steroid injections with the addition of hyaluronidase   Surgical Measures:   ACDF Dr Levine 29 years ago.    No history of previous lumbar spine or hip surgery   Renae Millan has not undergone orthopedic spine or neurosurgical consultation for her chronic pain condition   Renae Millan presents with significant comorbidities including hypothyroidism, HLP  In terms of current analgesics, Renae Millan takes:  Alpha Lipoic Acid, Rheumate, meloxicam, gabapentin, nortriptyline, Analgesic Gel (prilocaine 2%, lidocaine 10%, cyclobenzaprine 4%, capsaicin 0.001%, Meloxicam 0.1%, mannitol 20%), meclizine   I have reviewed Scot Report consistent with medication reconciliation.  SOAPP/ORT: Low Risk     PHQ-2 Depression Screening  Little interest or pleasure in doing things? 0-->not at all   Feeling down, depressed, or hopeless? 0-->not at all   PHQ-2 Total Score 0     Pain Self-Efficacy Questionnaire (PSEQ)  ITEM 07-18 2023 11-02 2023 02-01 2024         I can enjoy things despite the pain. 5  5  6         I can do most of the household chores (tidying up, washing dishes, etc), despite the pain. 5  5  6         I can socialize with my friends or family members as often as I used to do, despite the pain. 5  6  6         I can cope with my pain in most situations. 4  6  6         I can do some form of work, despite the pain (includes housework, paid, and unpaid work). 5  6  6         I can still do many of the things I enjoy doing, such as hobbies or leisure activity despite pain. 6  6  6         I can cope with my pain without medications. 4  4  3         I can accomplish most of my goals in life despite the pain. 4  6  6         I can live in a normal lifestyle, despite the pain. 6  6  4         I can gradually become more active, despite the pain. 5  6  5         TOTAL SCORE 49  56  54         49/60     Global Pain Scale 02-21 2023  07-18 2023 11-02 2023 02-01 2024           Pain 16 17  12   15           Feelings 9 5  1  6           Clinical outcomes 11 5  1  5           Activities 0 23  1  0           GPS Total: 36 50  15  26              The [place] Back Pain Disability Scale   DATE 02-21 2023 07-18 2023 11-02 2023 02-01 2024           Sleep through the night 5 3  2  4           Turn over in bed 3 2  2  3           Get out of bed 0 0  1  1           Make your bed 0 0  0  1           Put on socks (pantyhose) 2 0  2  3           Ride in a car 2 2  2  4           Sit in a chair for several hours 5 2  4  4           Stand up for 20-30 minutes 5 0  0  1           Climb one flight of stairs 2 2  4  1           Walk a few blocks (200-300 yards)  3 2  0  0           Walk several miles 2 2  0  1           Run one block (about 50 yards) 5 4  5  5           Take food out of the refrigerator 0 0  0  0           Reach up to high shelves 2 0  0  0           Move a chair 2 0  0  1           Pull or push heavy doors 1 0  0  1           Bend over to clean the bathtub 5 2  2  2           Throw a ball 0 0  0  1           Carry two bags of groceries 0 0  0  2           Lift and carry a heavy suitcase 5 0  1  3           Total score 49 21  25  38              Review of Diagnostic Studies:  I have independently reviewed and interpreted the images with the patient and used the images and a tridimensional spine model to explain findings. I have also reviewed the reports.  MRI of the thoracic spine w/o contrast on 08/25/2023 revealed no evidence of significant spondylosis or spinal canal or neural foramina stenosis.  Bilateral hip X-rays 07/05/2023: Mild hip and SI OA  EMG/NCV 04/19/2023: Normal study of back and legs  Flexion-extension x-rays of the lumbar spine on 02/20/2023 revealed 6 mm anterolisthesis of L3 on L4 without instability during flexion and extension  MRI of the lumbar spine without contrast on 2/13/2023 revealed grade 1 anterolisthesis of L3  on L4.  Vertebral body heights and alignment otherwise are preserved.  The conus medullaris is seen at L1 and has unremarkable appearance.  Axial imaging:  L1-L2: No significant canal or foraminal stenosis  L2-L3: Facet hypertrophy.  No significant canal or foraminal stenosis  L3-L4: Grade 1 anterolisthesis, disc bulge, degenerative endplate changes, facet hypertrophy, severe ligamentum flavum hypertrophy contributing to moderate canal stenosis, moderate lateral recess, and moderate left and mild right foraminal stenosis   L4-L5: Annular disc bulge. Facet hypertrophy.  Ligamentum flavum hypertrophy (>5 mm). Mild canal stenosis. No significant foraminal stenosis.    L5-S1: Loss of disc height. Rightward broad-based disc osteophyte complex that narrows the right lateral recess, degenerative endplate changes, facet hypertrophy.  Mild to moderate bilateral neuroforaminal stenosis, left greater than right  Lumbar x-rays AP, oblique, lateral views on 2/13/2023 revealed grade 1 anterior listhesis of L3 on L4.  Degenerative disc disease more pronounced from L3-L4 through L5-S1.  At L5-S1, is most advanced with loss of disc height.  Facet hypertrophy  CT scan of the lumbar spine without contrast on 1/19/2023 revealed grade 1 anterolisthesis of L3 on L4.  Multilevel degenerative disc disease particularly from L3-L4 through L5-S1 along with hypertrophy of the posterior elements.  Axial imaging: L3-L4: Disc bulge, moderate to severe central canal stenosis and mild to moderate bilateral neuroforaminal stenosis  L4-L5: Disc bulge, facet hypertrophy.  Mild canal stenosis.  No significant foraminal stenosis  L5-S1: Loss of disc height with a rightward broad-based disc osteophyte complex.  Mild canal stenosis.  Moderate bilateral neuroforaminal stenosis right greater than left    The following portions of the patient's history were reviewed and updated as appropriate: problem list, past medical history, past surgery history, social  history, family history, medication reconciliation, and allergies    Review of Systems   Musculoskeletal:  Positive for arthralgias, back pain and gait problem.   All other systems reviewed and are negative.        Patient Active Problem List   Diagnosis    Plantar fasciitis    Plantar fascial fibromatosis of right foot    Gait disturbance    Physical deconditioning    Degeneration of lumbar or lumbosacral intervertebral disc    Spondylosis of lumbar region without myelopathy or radiculopathy    Spondylolisthesis of lumbar region    Lumbar stenosis with neurogenic claudication    Bilateral primary osteoarthritis of hip    Lumbar radiculopathy       Past Medical History:   Diagnosis Date    Borderline high cholesterol     Hypothyroidism     Melanoma     Removed from Left arm         Past Surgical History:   Procedure Laterality Date    CERVICAL FUSION  06/1994    Dr. Chevy Peoples (C2,C3,C4)    TUBAL ABDOMINAL LIGATION           Family History   Problem Relation Age of Onset    Cancer Mother     Diabetes Mother     Diabetes Father     Leukemia Father          Social History     Socioeconomic History    Marital status:    Tobacco Use    Smoking status: Never    Smokeless tobacco: Never   Substance and Sexual Activity    Alcohol use: No    Drug use: No    Sexual activity: Defer           Current Outpatient Medications:     Alpha Lipoic Acid 200 MG capsule, Take 400 mg by mouth 3 (Three) Times a Day., Disp: 180 capsule, Rfl: 5    Cholecalciferol 25 MCG (1000 UT) capsule, take 1 capsule by oral route  twice a week with Vitamin D3 2,000, Disp: , Rfl:     Cholecalciferol 50 MCG (2000 UT) capsule, Take 1 capsule by mouth Daily., Disp: , Rfl:     Dietary Management Product (Rheumate) capsule, Take 1 capsule by mouth Daily., Disp: 90 capsule, Rfl: 2    estradiol (VAGIFEM) 10 MCG tablet vaginal tablet, Insert 1 tablet into the vagina Daily., Disp: , Rfl:     fluconazole (DIFLUCAN) 200 MG tablet, Take 1 tablet by  "mouth Daily., Disp: , Rfl:     gabapentin (NEURONTIN) 100 MG capsule, TAKE TWO CAPSULES BY MOUTH THREE TIMES A DAY, Disp: 180 capsule, Rfl: 0    Gel Base gel, 2 g 4 (Four) Times a Day. prilocaine 2%, lidocaine 10%, cyclobenzaprine 4%, capsaicin 0.001%, Meloxicam 0.1% and mannitol 20% cream, Disp: 30 g, Rfl: PRN    levothyroxine (SYNTHROID, LEVOTHROID) 112 MCG tablet, Take 1 tablet by mouth Daily., Disp: , Rfl:     meclizine (ANTIVERT) 25 MG tablet, Take 1 tablet by mouth Every 8 (Eight) Hours., Disp: , Rfl:     meloxicam (MOBIC) 15 MG tablet, , Disp: , Rfl:     multivitamin with minerals tablet tablet, Centrum Silver Women 8 mg iron-400 mcg-300 mcg tablet  Take by oral route., Disp: , Rfl:     nitrofurantoin, macrocrystal-monohydrate, (MACROBID) 100 MG capsule, TAKE 1 CAPSULE BY MOUTH EVERY 12 HOURS WITH FOOD FOR 10 DAYS, Disp: , Rfl:     nortriptyline (PAMELOR) 10 MG capsule, 1-2 tablets at bedtime, Disp: 60 capsule, Rfl: 3    pantoprazole (PROTONIX) 20 MG EC tablet, , Disp: , Rfl:     Probiotic Product (SOLUBLE FIBER/PROBIOTICS PO), Take  by mouth Daily., Disp: , Rfl:     simvastatin (ZOCOR) 20 MG tablet, , Disp: , Rfl:       Allergies   Allergen Reactions    Hycodan [Hydrocodone Bit-Homatrop Mbr] Shortness Of Breath    Latex Swelling     Pt reports experienced \"years ago\".           Ht 157.5 cm (62\")   Wt 72.6 kg (160 lb)   BMI 29.26 kg/m²       Physical Exam:  Constitutional: Patient appears well-developed, well-nourished, well-hydrated, appears younger than stated age  HEENT: Head: Normocephalic and atraumatic  Eyes: Conjunctivae and lids are normal  Pupils: Equal, round, reactive to light  Peripheral vascular exam: Femoral: right 2+, left 2+. Posterior tibialis: right 2+ and left 2+. Dorsalis pedis: right 2+ and left 2+. No edema.   Musculoskeletal   Gait and station: Gait evaluation demonstrated a normal gait. Able to walk on heels, toes, tandem walking   Lumbar Spine: Passive and active range of motion are " almost full and without pain. Extension, flexion, lateral flexion, rotation of the lumbar spine did not increase or reproduce pain. Lumbar facet joint loading maneuvers are negative.   Sacroiliac Joints: Kobe's test; Gaenslen's test; thigh thrust test; SI compression test; posterior shear test; SI distraction test; pelvic rock test; Yeoman's test: Negative   Piriformis maneuvers: Negative   Right Hip Joint: The range of motion of the hip joint is limited to flexion and internal rotation but without pain   Left Hip Joint: The range of motion of the hip joint is limited to flexion and internal rotation but without pain   Palpation of the bilateral ischial tuberosities: Unrevealing   Palpation of the bilateral psoas tendons and iliopsoas bursas: Unrevealing   Palpation of the bilateral greater trochanters: Unrevealing   Examination of the Iliotibial band: Unrevealing   Neurological:   Patient is alert and oriented to person, place, and time.   Speech: Normal.   Cortical function: Normal mental status.   Cranial nerves 2-12: intact.   Reflex Scores:  Right patellar: 0+  Left patellar: 1+  Right Achilles: 1+  Left Achilles: 1+  Motor strength: 5/5  Motor Tone: Normal  Involuntary movements: None.   Superficial/Primitive Reflexes: Primitive reflexes were absent.   Right Mobley: Absent  Left Mobley: Absent  Right ankle clonus: Absent  Left ankle clonus: Absent   Babinsky: Absent  Long tract signs: Negative. Straight leg raising test: Negative. Femoral stretch sign: Negative.   Sensory exam: Intact to light touch, intact pain and temperature sensation, intact vibration sensation and normal proprioception  Coordination: Finger to nose: Normal. Balance: Normal Romberg's sign: Negative   Skin and subcutaneous tissue: Skin is warm and intact. No rash noted. No cyanosis.   Psychiatric: Judgment and insight: Normal. Recent and remote memory: Intact. Mood and affect: Normal.     ASSESSMENT:   1. Lumbar stenosis with  neurogenic claudication    2. Ligamentum flavum hypertrophy    3. Lumbar radiculopathy    4. Spondylolisthesis of lumbar region    5. Spondylosis of lumbar region without myelopathy or radiculopathy    6. Degeneration of lumbar or lumbosacral intervertebral disc    7. Bilateral primary osteoarthritis of hip    8. Physical deconditioning    9. Gait disturbance        PLAN/MEDICAL DECISION MAKING:  Ms. Renae Millan, 73 y.o. female presents with a history of chronic intractable lower back and lower extremity pain. Renae Millan was last seen on 11/08/2024, when she underwent diagnostic and therapeutic right L3-L4 and right L4-L5 transforaminal epidural steroid injections with the addition of hyaluronidase from which she has experienced 50% pain relief and functional improvement including improvement of her neurogenic claudication that lasted for about 10 weeks with progressive recurrence. She complains of right hip pain, and for the past week, she started experiencing pain in her legs from her knees down. MRI of the lumbar spine without contrast on 02/13/2023 revealed diffuse lumbar spondylosis most significant from L3-L4 through L5-S1 with multilevel disc bulges, lumbar facet hypertrophy, ligamentum flavum hypertrophy contributing to moderate degrees of canal, lateral recess, and neuroforaminal stenosis. At L3-L4: Grade 1 anterolisthesis, disc bulge, degenerative endplate changes, facet hypertrophy, severe ligamentum flavum hypertrophy contributing to moderate to severe canal stenosis, moderate lateral recess stenosis and moderate left and mild right foraminal stenosis. At L4-L5: Annular disc bulge, facet hypertrophy, ligamentum flavum hypertrophy (>5 mm) contributing to moderate to severe canal stenosis. No significant foraminal stenosis.  At L5-S1: Loss of disc height. Mild to moderate bilateral neuroforaminal stenosis, left greater than right.  Flexion and extension x-rays of the lumbar spine on 02/20/2023, 6 mm  anterolisthesis of L3 on L4 without instability during flexion and extension. MRI of the thoracic spine w/o contrast on 08/25/2023 revealed no evidence of significant spinal canal or neural foramina stenosis. Bilateral hip X-rays on 07/05/2023: Mild hip and SI OA. EMG/NCV on 04/19/2023: Unrevealing. Renae Millan failed to obtain pain relief with conservative measures including oral analgesics, topical analgesics, ice, heat, TENs, physical therapy, physical therapist directed home exercise program HEP (ongoing), therapeutic massage, independent exercise program (ongoing), to name a few. Renae Millan experienced remarkable analgesic and functional improvement after diagnostic and therapeutic bilateral L5-S1 transforaminal epidural steroid injections with the addition of hyaluronidase on 03/01/2023, diagnostic and therapeutic bilateral L3-L4 transforaminal epidural steroid injections with the addition of hyaluronidase on 07/24/2023, and more recently, on 11/08/2024, after diagnostic and therapeutic right L3-L4 and right L4-L5 transforaminal epidural steroid injections with the addition of hyaluronidase. Patient denies significant changes in her medical history. Pain has progressed in intensity over the past weeks. At this time, we have discussed moving to the next step on the algorithm for treatment of her pain. She has declined surgical consultation.  A comprehensive evaluation including history and physical exam along with pertinent physiologic and functional assessment was performed. Patient presents with intractable pain due to the diagnoses listed above. Patient has failed to respond to conservative modalities, as referenced under HPI.  Patient has responded well to minimally invasive interventional pain management measures, as referenced from previous notes and under HPI. I have documented the impact of patient's moderate-to-severe pain contributing to significant impairment in daily activities, ADLs, and a  negative impact on the patient's quality of life, as reflected on Global Pain Scale 26/100 (down from 50); The [place] Back Pain Disability Scale 38/100 (down from 49); Tinetti Gait & Balance Assessment Tool  (low risk for falls). I have reviewed pertinent supporting diagnostic studies of patient's chronic pain condition as well as all available pertinent medical records to patient's chronic pain condition including previous therapies, as referenced above. PHQ-2 Depression Screening 0; Pain Self-Efficacy Questionnaire (PSEQ) 54/60   (56/60). I had a lengthy conversation with Ms. Renae Millan regarding her chronic pain condition and potential therapeutic options including risks, benefits, alternative therapies, to name a few. We have discussed using a stepwise approach starting with the least intense level of care as determined by the extent required to diagnose and or treat a patient's condition. The proposed treatments are consistent with the patient's medical condition and known to be safe and effective by current guidelines and the standard of care. The duration and frequency proposed are considered appropriate for the service in accordance with accepted standards of medical practice for the diagnosis and treatment of the patient's condition and intended to improve the patient's level of function. These services will be furnished in a setting appropriate to the patient's medical needs and condition. Therefore, I have proposed the following plan:     1. Interventional pain management measures: Patient does not take blood thinners. We have discussed different approaches for treatment of her chronic pain. She has declined surgical consultation. Her last procedure provided relief but less than the 2 previous ones. We discussed repeating therapeutic right L3-L4 and right L4-L5 transforaminal epidural steroid injections with the addition of hyaluronidase combined with active physical therapy vs MILD. Patient has opted  for a MILD procedure bilaterally at L3-L4 and L4-L5 as ligamentum flavum hypertrophy is the main component of her central canal stenosis. We have also discussed Vertiflex at L3-L4 and L4-L5, PNS Sprint Dual Lead System (right posterior tibial nerve) Vs SCS Vs lumbar myelogram followed by CT postmyelogram and repeat EMG/NCV to facilitate a referral to neurosurgery .    2. Diagnostic studies:   A. Random UDS with gabapentin  B. CBC, PT, PTT   C. I have discussed prospects of lumbar myelogram followed by CT postmyelogram     3. Pharmacological measures: Reviewed and discussed;   A. Continue Tylenol PRN, naproxen PRN  B. Will refill new Rx for gabapentin 300 mg TID, #90, 2 refills  C. Increase nortriptyline (new Rx) to 25 mg 1-2 tablets at bedtime, #60, 2 refills  D. Continue Rheumate one tablet once daily  E. Continue alpha lipoid acid 5420-2888 mg per day divided into 3 doses  F.  Continue prilocaine 2%, lidocaine 10%, cyclobenzaprine 4%, capsaicin 0.001% and mannitol 20% cream, apply 1 to 2 grams of cream to the affected areas every 4 to 6 hours as needed    Screening and compliance with controlled substances:  Patient has completed a SOAPP and ORT questionnaires (revealing low risk). Scot report has been reviewed and appropriate. Patient has signed a consent for treatment with controlled substances after reviewing the document and verbalizing full understanding of the risks, benefits, alternatives, and consequences of compliance and adherence with treatment and comprehensive plan of care. Patient received in hand a copy of this document.    4. Long-term rehabilitation efforts:  A. The patient does not have a history of falls. Also, I performed a risk assessment for falls using the Tinetti gait & balance assessment tool (scored low risk for falls).   B. Patient will start a comprehensive physical therapy program for Alter-G, water therapy, gait and balance training, neurodynamics, core strengthening, gluteal and  abductor strengthening, ultrasound, ASTYM, E-STIM, myofascial release, cupping, dry needling, home exercise program, 2-3 x per week for 8 weeks once pain is under control   C. Contrast therapy: Apply ice-packs for 15-20 minutes, followed by heating pads for 15-20 minutes to affected area   D. Start a low impact exercise program such as water therapy, swimming, yoga, Pilates, Guy Chi, daily walks for fitness  E. I have prescribed a home exercise program with instructions. I have spent a significant amount of time talking with the patient and providing specific recommendations regarding lifestyle modification, preventive measures, and self-care management of chronic pain.  In addition, I have provided a copy of the booklet Care of the back by Max Knowles MD and Coral Benitez MD to be used as a physician supervised home exercise program  F. Prior to SCS: Referral to Dr. Álvaro Sanz for psychological screening for spinal cord stimulation and intrathecal therapies.  G. Renae Millan  reports that she has never smoked. She has never used smokeless tobacco.    5. The patient has been instructed to contact my office with any questions or difficulties. The patient understands the plan and agrees to proceed accordingly.    The patient has a documented plan of care to address chronic pain. Renae Millan reports a pain score of 6/10.  Given her pain assessment as noted, treatment options were discussed and the following options were decided upon as a follow-up plan to address the patient's pain: continuation of current treatment plan for pain, educational materials on pain management, home exercises and therapy, prescription for non-opiod analgesics, referral to Physical Therapy, referral to specialist for assistance in pain treatment guidance, steroid injections, use of non-medical modalities (ice, heat, stretching and/or behavior modifications), and interventional pain management measures .               Pain Management  Panel           No data to display                 LUIS E query complete. LUIS E reviewed by Ziggy Moscoso MD.     Pain Medications               gabapentin (NEURONTIN) 100 MG capsule TAKE TWO CAPSULES BY MOUTH THREE TIMES A DAY    meloxicam (MOBIC) 15 MG tablet     nortriptyline (PAMELOR) 10 MG capsule 1-2 tablets at bedtime             No orders of the defined types were placed in this encounter.         Please note that portions of this note were completed with a voice recognition program.   Any copied data in any portion of my note has been reviewed by myself and accurate.     The 21st Century Cures Act makes medical notes like this available to patients in the interest of transparency. This is a medical document intended as peer to peer communication. It is written in medical language and may contain abbreviations or verbiage that are unfamiliar. It may appear blunt or direct. Medical documents are intended to carry relevant information, facts as evident, and the clinical opinion of the practitioner.     Ziggy Moscoso MD    Patient Care Team:  Babak Dixon MD as PCP - General (Family Medicine)  Ziggy Moscoso MD as Consulting Physician (Pain Medicine)     No orders of the defined types were placed in this encounter.        No future appointments.

## 2024-02-14 ENCOUNTER — TELEPHONE (OUTPATIENT)
Dept: PAIN MEDICINE | Facility: CLINIC | Age: 74
End: 2024-02-14
Payer: MEDICARE

## 2024-02-14 DIAGNOSIS — M48.062 LUMBAR STENOSIS WITH NEUROGENIC CLAUDICATION: Primary | ICD-10-CM

## 2024-02-14 RX ORDER — CHLORHEXIDINE GLUCONATE 4 G/100ML
SOLUTION TOPICAL DAILY
Qty: 236 ML | Refills: 0 | Status: SHIPPED | OUTPATIENT
Start: 2024-02-14

## 2024-02-15 ENCOUNTER — LAB (OUTPATIENT)
Dept: LAB | Facility: HOSPITAL | Age: 74
End: 2024-02-15
Payer: MEDICARE

## 2024-02-15 DIAGNOSIS — Z51.81 THERAPEUTIC DRUG MONITORING: ICD-10-CM

## 2024-02-15 DIAGNOSIS — Z01.818 PRE-OP TESTING: ICD-10-CM

## 2024-02-15 LAB
AMPHET+METHAMPHET UR QL: NEGATIVE
AMPHETAMINES UR QL: NEGATIVE
APTT PPP: 32.2 SECONDS (ref 22–39)
BARBITURATES UR QL SCN: NEGATIVE
BENZODIAZ UR QL SCN: NEGATIVE
BUPRENORPHINE SERPL-MCNC: NEGATIVE NG/ML
CANNABINOIDS SERPL QL: NEGATIVE
COCAINE UR QL: NEGATIVE
DEPRECATED RDW RBC AUTO: 38.4 FL (ref 37–54)
ERYTHROCYTE [DISTWIDTH] IN BLOOD BY AUTOMATED COUNT: 12.7 % (ref 12.3–15.4)
FENTANYL UR-MCNC: NEGATIVE NG/ML
HCT VFR BLD AUTO: 40.4 % (ref 34–46.6)
HGB BLD-MCNC: 13.5 G/DL (ref 12–15.9)
INR PPP: 0.96 (ref 0.89–1.12)
MCH RBC QN AUTO: 28.4 PG (ref 26.6–33)
MCHC RBC AUTO-ENTMCNC: 33.4 G/DL (ref 31.5–35.7)
MCV RBC AUTO: 84.9 FL (ref 79–97)
METHADONE UR QL SCN: NEGATIVE
OPIATES UR QL: NEGATIVE
OXYCODONE UR QL SCN: NEGATIVE
PCP UR QL SCN: NEGATIVE
PLATELET # BLD AUTO: 365 10*3/MM3 (ref 140–450)
PMV BLD AUTO: 10.6 FL (ref 6–12)
PROTHROMBIN TIME: 12.9 SECONDS (ref 12.2–14.5)
RBC # BLD AUTO: 4.76 10*6/MM3 (ref 3.77–5.28)
TRICYCLICS UR QL SCN: NEGATIVE
WBC NRBC COR # BLD AUTO: 5.82 10*3/MM3 (ref 3.4–10.8)

## 2024-02-15 PROCEDURE — 36415 COLL VENOUS BLD VENIPUNCTURE: CPT

## 2024-02-15 PROCEDURE — 85027 COMPLETE CBC AUTOMATED: CPT

## 2024-02-15 PROCEDURE — 85730 THROMBOPLASTIN TIME PARTIAL: CPT

## 2024-02-15 PROCEDURE — 80307 DRUG TEST PRSMV CHEM ANLYZR: CPT

## 2024-02-15 PROCEDURE — 85610 PROTHROMBIN TIME: CPT

## 2024-02-20 ENCOUNTER — DOCUMENTATION (OUTPATIENT)
Dept: PAIN MEDICINE | Facility: CLINIC | Age: 74
End: 2024-02-20

## 2024-02-20 ENCOUNTER — OUTSIDE FACILITY SERVICE (OUTPATIENT)
Dept: PAIN MEDICINE | Facility: CLINIC | Age: 74
End: 2024-02-20
Payer: MEDICARE

## 2024-02-20 DIAGNOSIS — G89.18 POSTOPERATIVE PAIN: Primary | ICD-10-CM

## 2024-02-20 DIAGNOSIS — M79.2 NEUROPATHIC PAIN: Primary | ICD-10-CM

## 2024-02-20 DIAGNOSIS — G89.18 POSTOPERATIVE PAIN: ICD-10-CM

## 2024-02-20 PROCEDURE — 0275T PR PERC LAMINO-/LAMINECTOMY INDIR IMAG GUIDE LUMBAR: CPT | Performed by: ANESTHESIOLOGY

## 2024-02-20 RX ORDER — GABAPENTIN 100 MG/1
100 CAPSULE ORAL 3 TIMES DAILY
Qty: 180 CAPSULE | Refills: 2 | Status: SHIPPED | OUTPATIENT
Start: 2024-02-20

## 2024-02-20 RX ORDER — HYDROCODONE BITARTRATE AND ACETAMINOPHEN 5; 325 MG/1; MG/1
TABLET ORAL
Qty: 12 TABLET | Refills: 0 | Status: SHIPPED | OUTPATIENT
Start: 2024-02-20 | End: 2024-02-20 | Stop reason: SDUPTHER

## 2024-02-20 RX ORDER — TIZANIDINE 2 MG/1
TABLET ORAL
Qty: 20 TABLET | Refills: 0 | Status: SHIPPED | OUTPATIENT
Start: 2024-02-20 | End: 2024-02-20 | Stop reason: SDUPTHER

## 2024-02-20 RX ORDER — TIZANIDINE 2 MG/1
TABLET ORAL
Qty: 20 TABLET | Refills: 0 | Status: SHIPPED | OUTPATIENT
Start: 2024-02-20

## 2024-02-20 RX ORDER — HYDROCODONE BITARTRATE AND ACETAMINOPHEN 5; 325 MG/1; MG/1
TABLET ORAL
Qty: 12 TABLET | Refills: 0 | Status: SHIPPED | OUTPATIENT
Start: 2024-02-20

## 2024-02-20 NOTE — PROGRESS NOTES
[facility]   [address] [place] [address] [phone]         PREOPERATIVE DIAGNOSES:   1. Lumbar stenosis with neurogenic claudication   2. Ligamentum flavum hypertrophy   3. Lumbar radiculopathy   4. Spondylolisthesis of lumbar region   5. Spondylosis of lumbar region without myelopathy or radiculopathy   6. Degeneration of lumbar or lumbosacral intervertebral disc   7. Physical deconditioning   8. Gait disturbance     POSTOPERATIVE DIAGNOSES:   1. Lumbar stenosis with neurogenic claudication   2. Ligamentum flavum hypertrophy   3. Lumbar radiculopathy   4. Spondylolisthesis of lumbar region   5. Spondylosis of lumbar region without myelopathy or radiculopathy   6. Degeneration of lumbar or lumbosacral intervertebral disc   7. Physical deconditioning   8. Gait disturbance   Encounter for examination for normal comparison and control in clinical research (Z00.6)   National clinical trial number [id number]     PROCEDURE PERFORMED:   Bilateral MILD procedure at L3-L4, and L4-L5, percutaneous lumbar decompression (0275T)   MILD KIT Ref. [id number] Lot [id number] Exp. 05/04/2027  Fluoroscopic guidance   Epidurogram at the target area that is being treated and debulked     ANESTHESIA: MAC + Local anesthesia (Twenty ml of 0.25% bupivacaine with epineprhine 1:200.000 were injected through the skin and subcutaneous tissues by way of a spinal needle into the lamina of the areas of concern throughout the procedure)     ANTIBIOTICS:  Ancef 2 g intravenously 30 minutes prior to the procedure as prophylaxis.     BLOOD LOSS: < 10 ml     COMPLICATIONS: NONE     INDICATIONS/MEDICAL DECISION MAKING: Lumbar spinal stenosis with a main component of ligamentum flavum hypertrophy associated with severe neurogenic claudication refractory to conservative measures.  Ms. Renae Millan presents with a history of chronic intractable lower back and lower extremity pain. MRI of the lumbar spine without contrast on  02/13/2023 revealed diffuse lumbar spondylosis most significant from L3-L4 through L5-S1 with multilevel disc bulges, lumbar facet hypertrophy, ligamentum flavum hypertrophy contributing to moderate degrees of canal, lateral recess, and neuroforaminal stenosis. At L3-L4: Grade 1 anterolisthesis, disc bulge, degenerative endplate changes, facet hypertrophy, severe ligamentum flavum hypertrophy contributing to moderate to severe canal stenosis, moderate lateral recess stenosis and moderate left and mild right foraminal stenosis. At L4-L5: Annular disc bulge, facet hypertrophy, ligamentum flavum hypertrophy (>5 mm) contributing to moderate to severe canal stenosis. No significant foraminal stenosis.  At L5-S1: Loss of disc height. Mild to moderate bilateral neuroforaminal stenosis, left greater than right.  Flexion and extension x-rays of the lumbar spine on 02/20/2023, 6 mm anterolisthesis of L3 on L4 without instability during flexion and extension. Renae Millan failed to obtain pain relief with conservative measures including oral analgesics, topical analgesics, ice, heat, TENs, physical therapy, physical therapist directed home exercise program HEP, therapeutic massage, independent exercise program , to name a few. Renae Millan experienced remarkable analgesic and functional improvement after diagnostic and therapeutic bilateral L5-S1 transforaminal epidural steroid injections with the addition of hyaluronidase on 03/01/2023, diagnostic and therapeutic bilateral L3-L4 transforaminal epidural steroid injections with the addition of hyaluronidase on 07/24/2023, and more recently, on 11/08/2024, after diagnostic and therapeutic right L3-L4 and right L4-L5 transforaminal epidural steroid injections with the addition of hyaluronidase. We have discussed moving to the next step on the algorithm for treatment of her chronic pain. She declined surgical consultation.  A comprehensive evaluation including history and physical  exam along with pertinent physiologic and functional assessment was performed. Patient presents with intractable pain due to the diagnoses listed above. Patient has failed to respond to conservative modalities, as referenced under HPI.  Patient has responded well to minimally invasive interventional pain management measures, as referenced from previous notes and under HPI. I have documented the impact of patient's moderate-to-severe pain contributing to significant impairment in daily activities, ADLs, and a negative impact on the patient's quality of life, as reflected on Global Pain Scale; The [place] Back Pain Disability Scale. I have reviewed pertinent supporting diagnostic studies of patient's chronic pain condition as well as all available pertinent medical records to patient's chronic pain condition including previous therapies, as referenced above. PHQ-2 Depression Screening 0; Pain Self-Efficacy Questionnaire (PSEQ) 54/60. I had a lengthy conversation with Ms. Renae Millan regarding her chronic pain condition and potential therapeutic options including risks, benefits, alternative therapies, to name a few. We have discussed using a stepwise approach starting with the least intense level of care as determined by the extent required to diagnose and or treat a patient's condition. The proposed treatments are consistent with the patient's medical condition and known to be safe and effective by current guidelines and the standard of care. The duration and frequency proposed are considered appropriate for the service in accordance with accepted standards of medical practice for the diagnosis and treatment of the patient's condition and intended to improve the patient's level of function. These services will be furnished in a setting appropriate to the patient's medical needs and condition. According to the treatment algorithm for lumbar spinal stenosis, the patient is a candidate for lumbar decompression procedure  (MILD/PILD). The patient understood and agreed with the proposed treatment and opted for a MILD procedure. Informed consent was obtained.      PROCEDURE SUMMARY: The patient's ID was confirmed and a time-out was performed. Time, site, location, and procedure agreed upon and consented for were reviewed. The patient was placed in the prone position in the fluoroscopy suite with a pillow (bolster) under the hips to assist with reversing the lordosis of the lumbar spine. MAC was provided by anesthesia. Following IV antibiotic administration, the patient was prepped and draped in the usual standard fashion. The anatomical landmarks were identified and local anesthetics were infiltrated to anesthetize the skin and deep tissues accordingly. A Tuohy needle was advanced at L4-L5 using the loss of resistance technique under fluoroscopic guidance. An epidurogram was performed by injecting minimal amounts of myelographically compatible contrast revealing decreased contrast flow past the levels of concern. An oblique contralateral view of the epidurogram was obtained, which indicated lumbar spinal stenosis as shown by the decreased contrast flow. A 2 mm skin incision was made with an 11 scalpel blade, and a trocar with portal was inserted percutaneously under fluoroscopic guidance to contact the lamina at the indicated levels. A bone-sculpting rongeur was inserted to remove adequate amounts of lamina (laminotomy) from the superior surface of the inferior operative lamina site and from the inferior aspect of the lamina above it. The laminotomy created a passage for the tissue sculpting instrument used in debulking the ligamentum flavum. The ligamentum flavum was debulked in such a way that epidural contrast, upon repeating the epidurogram, showed improved contrast flow. A similar procedure was performed at the additional levels/sides of treatment as referenced. There were no complications or difficulties noted with these  procedures. The epidurogram was refreshed throughout the procedure demonstrating improvement in the original narrowed canal. Fluoroscopy time: 206 seconds. Upon completion of the procedure, instruments were removed. Hemostasis was achieved by direct pressure, and closure of the wounds performed with Nylon 3:0. Covaderm dressings were applied. A successful fluoroscopically guided percutaneous lumbar decompression was undertaken at the indicated levels above. The patient was neurologically intact without complications, freely able to move and respond to commands during her procedure, and able to move the lower extremities. Movement of toes, feet, knees, and hips were reviewed as well as there was no loss of sensation in the lower extremities prior to being discharged from the procedure room and later on as reassessed in the recovery room. The patient tolerated the procedure well. Upon transferring the patient to the recovery room, the patient's vital signs remained stable and without any evidence of neurologic deficits as a brief neurological exam showed adequate strength and no loss of sensation in the lower extremities.     PLAN:   The patient was discharged with pertinent instructions to take it easy for rest of the day due to the effect of the sedatives, apply ice-packs to the surgical sites, and resume progressive mobility and physical therapy thereafter. The patient was given a follow-up appointment along with instructions and contact information for any questions or concerns. Patient will return to the clinic in 2 weeks to remove stitches. The patient has been instructed to contact my office with any questions or difficulties. The patient understands the plan and agrees to proceed accordingly.        Ziggy Moscoso MD        Please note that portions of this note were completed with a voice recognition program.       Signatures   Electronically signed by: Ziggy Moscoso M.D.; FEBRUARY 20, 2024, 12:22 PM EST  (Author)

## 2024-02-21 ENCOUNTER — TELEPHONE (OUTPATIENT)
Dept: PAIN MEDICINE | Facility: CLINIC | Age: 74
End: 2024-02-21
Payer: MEDICARE

## 2024-02-21 NOTE — TELEPHONE ENCOUNTER
FOLLOW-UP CALL AFTER PROCEDURE  I spoke with Renae Millan regarding how they're feeling after yesterday's procedure with Dr. Moscoso. Patient reports that  she is doing well.   Pt underwent a MILD procedure (see procedure note for details) on 2/20/2024 . Patient reported 100% pain relief at the time of after procedure exam with Dr. Moscoso. In addition, patient experienced significant functional improvement (performing activities without experiencing pain in comparison with examination prior to the procedure that reproduced pain with those activities).   Today, Renae Monty reports 70% ongoing pain relief pain (pain level 4/10).   Patient denies side effects or complications  Patient does not have any questions or concerns at this time.  Advised pt of next follow up with ALIA Paul  3/5/2024 at 1000.

## 2024-03-05 ENCOUNTER — OFFICE VISIT (OUTPATIENT)
Dept: PAIN MEDICINE | Facility: CLINIC | Age: 74
End: 2024-03-05
Payer: MEDICARE

## 2024-03-05 VITALS — BODY MASS INDEX: 29.77 KG/M2 | HEIGHT: 62 IN | WEIGHT: 161.8 LBS

## 2024-03-05 DIAGNOSIS — M24.28 LIGAMENTUM FLAVUM HYPERTROPHY: ICD-10-CM

## 2024-03-05 DIAGNOSIS — M47.816 SPONDYLOSIS OF LUMBAR REGION WITHOUT MYELOPATHY OR RADICULOPATHY: ICD-10-CM

## 2024-03-05 DIAGNOSIS — M54.16 LUMBAR RADICULOPATHY: ICD-10-CM

## 2024-03-05 DIAGNOSIS — M48.062 LUMBAR STENOSIS WITH NEUROGENIC CLAUDICATION: ICD-10-CM

## 2024-03-05 DIAGNOSIS — R53.81 PHYSICAL DECONDITIONING: ICD-10-CM

## 2024-03-05 DIAGNOSIS — M51.37 DEGENERATION OF LUMBAR OR LUMBOSACRAL INTERVERTEBRAL DISC: ICD-10-CM

## 2024-03-05 DIAGNOSIS — R26.9 GAIT DISTURBANCE: ICD-10-CM

## 2024-03-05 DIAGNOSIS — M43.16 SPONDYLOLISTHESIS OF LUMBAR REGION: ICD-10-CM

## 2024-03-05 PROCEDURE — 1126F AMNT PAIN NOTED NONE PRSNT: CPT | Performed by: NURSE PRACTITIONER

## 2024-03-05 PROCEDURE — 1160F RVW MEDS BY RX/DR IN RCRD: CPT | Performed by: NURSE PRACTITIONER

## 2024-03-05 PROCEDURE — 99213 OFFICE O/P EST LOW 20 MIN: CPT | Performed by: NURSE PRACTITIONER

## 2024-03-05 PROCEDURE — 1159F MED LIST DOCD IN RCRD: CPT | Performed by: NURSE PRACTITIONER

## 2024-03-05 NOTE — PROGRESS NOTES
"Chief Complaint: \"II am doing very well.\"        History of Present Illness:  Ms. Renae Millan, 74 y.o. female reports a 2-year history of chronic progressive lower back and bilateral lower extremity pain.  She has been worked up for inflammatory arthritis, which has been negative. An MRI of the lumbar spine without contrast on 02/13/2023 revealed diffuse lumbar spondylosis most significant from L3-L4 through L5-S1 with multilevel disc bulges, lumbar facet hypertrophy, ligamentum flavum hypertrophy contributing to moderate degrees of canal, lateral recess, and neuroforaminal stenosis. L3-L4: Grade 1 anterolisthesis, disc bulge, degenerative endplate changes, facet hypertrophy, severe ligamentum flavum hypertrophy contributing to moderate to severe canal stenosis, moderate lateral recess stenosis and moderate left and mild right foraminal stenosis. At L4-L5: Annular disc bulge, facet hypertrophy, ligamentum flavum hypertrophy (>5 mm) contributing to moderate canal stenosis. No significant foraminal stenosis.  At L5-S1: Loss of disc height. Mild to moderate bilateral neuroforaminal stenosis, left greater than right. On 11/08/2024,  she underwent diagnostic and therapeutic right L3-L4 and right L4-L5 transforaminal epidural steroid injections with the addition of hyaluronidase from which she has experienced 50% pain relief and functional improvement including resolution of her neurogenic claudication that lasted for about 10 weeks with progressive recurrence. She completed a physical therapy program and continues a HEP based on water therapy.  Due to nonsustained relief of minimally invasive interventional pain management procedures, and as one of the main components of her spinal stenosis is ligamentum flavum hypertrophy, she underwent a mild procedure bilaterally at L3-L4 and L4-L5 performed on 2/20/2024.  She is overall reporting about a 95% improvement in her pain and symptoms.  She reports she has remained " afebrile since procedure, she denies any erythema or drainage from her procedural incision.  Pain Description: Previously a constant lower back and right hip pain with intermittent exacerbation, described as aching, dull, and throbbing sensation.   Radiation of Pain: The pain previously radiated into the right hip and right anterior thigh  Pain intensity today: 0/10    Average pain intensity last week: 0/10    Pain intensity ranges from: 0/10 to 0/10    Aggravating factors: Pain increases with when climbing steps (improved since last visit). Patient reports improvement of her neurogenic claudication. Patient does not use a cane or walker  Alleviating factors: Pain decreases with sitting down, sitting on a recliner, lying down flat on her back  Associated Symptoms:   Patient denies pain, numbness, or weakness in the lower extremities   Patient denies any new bladder or bowel problems.   Patient reports difficulties with her balance but denies recent falls.   She reports significant improvement in her sleep    Review of previous therapies and additional medical records:  Renae Millan has already failed the following measures, including:   Conservative Measures: Oral analgesics, topical analgesics, ice, heat, TENs, physical therapy, physical therapist directed home exercise program HEP (ongoing), therapeutic massage, independent exercise program (ongoing)  Interventional Measures:   MILD procedure bilaterally at L3-L4 and L4-L5  11/08/2024: Diagnostic and therapeutic right L3-L4 and right L4-L5 transforaminal epidural steroid injections with the addition of hyaluronidase   07/24/2023: Diagnostic and therapeutic bilateral L3-L4 transforaminal epidural steroid injections with the addition of hyaluronidase   03/01/2023: Diagnostic and therapeutic bilateral L5-S1 transforaminal epidural steroid injections with the addition of hyaluronidase   Surgical Measures:   ACDF Dr Levine 29 years ago.  No history of previous lumbar  spine or hip surgery   Renae Millan has not undergone orthopedic spine or neurosurgical consultation for her chronic pain condition   Renae Millan presents with significant comorbidities including hypothyroidism, HLP  In terms of current analgesics, Renae Millan takes:  Alpha Lipoic Acid, Rheumate, meloxicam, gabapentin, nortriptyline, Analgesic Gel, meclizine   I have reviewed Scot Report consistent with medication reconciliation.  SOAPP/ORT: Low Risk        Global Pain Scale 02-21 2023 07-18 2023 11-02 2023 02-01 2024 03-05 2024         Pain 16 17  12   15  0         Feelings 9 5  1  6  0         Clinical outcomes 11 5  1  5  1         Activities 0 23  1  0  0         GPS Total: 36 50  15  26  1            The [place] Back Pain Disability Scale   DATE 02-21 2023 07-18 2023 11-02 2023 02-01 2024           Sleep through the night 5 3  2  4           Turn over in bed 3 2  2  3           Get out of bed 0 0  1  1           Make your bed 0 0  0  1           Put on socks (pantyhose) 2 0  2  3           Ride in a car 2 2  2  4           Sit in a chair for several hours 5 2  4  4           Stand up for 20-30 minutes 5 0  0  1           Climb one flight of stairs 2 2  4  1           Walk a few blocks (200-300 yards)  3 2  0  0           Walk several miles 2 2  0  1           Run one block (about 50 yards) 5 4  5  5           Take food out of the refrigerator 0 0  0  0           Reach up to high shelves 2 0  0  0           Move a chair 2 0  0  1           Pull or push heavy doors 1 0  0  1           Bend over to clean the bathtub 5 2  2  2           Throw a ball 0 0  0  1           Carry two bags of groceries 0 0  0  2           Lift and carry a heavy suitcase 5 0  1  3           Total score 49 21  25  38              Review of Diagnostic Studies:   MRI of the thoracic spine w/o contrast on 08/25/2023 revealed no evidence of significant spondylosis or spinal canal or neural foramina stenosis.  Bilateral hip  X-rays 07/05/2023: Mild hip and SI OA  EMG/NCV of the bilateral lower extremities 04/19/2023: Normal study of back and legs  Flexion-extension x-rays of the lumbar spine 02/20/2023:  6 mm anterolisthesis of L3 on L4 without instability during flexion and extension  MRI of the lumbar spine without contrast 2/13/2023: grade 1 anterolisthesis of L3 on L4.    L1-L2: No significant canal or foraminal stenosis  L2-L3: Facet hypertrophy.  No significant canal or foraminal stenosis  L3-L4: Grade 1 anterolisthesis, disc bulge, degenerative endplate changes, facet hypertrophy, severe ligamentum flavum hypertrophy contributing to moderate canal stenosis, moderate lateral recess, and moderate left and mild right foraminal stenosis   L4-L5: Annular disc bulge. Facet hypertrophy.  Ligamentum flavum hypertrophy (>5 mm). Mild canal stenosis. No significant foraminal stenosis.    L5-S1: Loss of disc height. Rightward broad-based disc osteophyte complex that narrows the right lateral recess, degenerative endplate changes, facet hypertrophy.  Mild to moderate bilateral neuroforaminal stenosis, left greater than right  Lumbar x-rays AP, oblique, lateral views on 2/13/2023 revealed grade 1 anterior listhesis of L3 on L4.  Degenerative disc disease more pronounced from L3-L4 through L5-S1.  At L5-S1, is most advanced with loss of disc height.  Facet hypertrophy  CT scan of the lumbar spine without contrast 1/19/2023: grade 1 anterolisthesis of L3 on L4.  Multilevel degenerative disc disease particularly from L3-L4 through L5-S1 along with hypertrophy of the posterior elements.  Axial imaging: L3-L4: Disc bulge, moderate to severe central canal stenosis and mild to moderate bilateral neuroforaminal stenosis  L4-L5: Disc bulge, facet hypertrophy.  Mild canal stenosis.  No significant foraminal stenosis  L5-S1: Loss of disc height with a rightward broad-based disc osteophyte complex.  Mild canal stenosis.  Moderate bilateral neuroforaminal  stenosis right greater than left    The following portions of the patient's history were reviewed and updated as appropriate: problem list, past medical history, past surgery history, social history, family history, medication reconciliation, and allergies    Review of Systems   Musculoskeletal:  Positive for arthralgias.   All other systems reviewed and are negative.        Patient Active Problem List   Diagnosis    Plantar fasciitis    Plantar fascial fibromatosis of right foot    Gait disturbance    Physical deconditioning    Degeneration of lumbar or lumbosacral intervertebral disc    Spondylosis of lumbar region without myelopathy or radiculopathy    Spondylolisthesis of lumbar region    Lumbar stenosis with neurogenic claudication    Bilateral primary osteoarthritis of hip    Lumbar radiculopathy    Ligamentum flavum hypertrophy       Past Medical History:   Diagnosis Date    Borderline high cholesterol     Hypothyroidism     Melanoma     Removed from Left arm         Past Surgical History:   Procedure Laterality Date    CERVICAL FUSION  06/1994    Dr. Chevy Peoples (C2,C3,C4)    TUBAL ABDOMINAL LIGATION           Family History   Problem Relation Age of Onset    Cancer Mother     Diabetes Mother     Diabetes Father     Leukemia Father          Social History     Socioeconomic History    Marital status:    Tobacco Use    Smoking status: Never    Smokeless tobacco: Never   Substance and Sexual Activity    Alcohol use: No    Drug use: No    Sexual activity: Defer           Current Outpatient Medications:     Alpha Lipoic Acid 200 MG capsule, Take 400 mg by mouth 3 (Three) Times a Day., Disp: 180 capsule, Rfl: 5    chlorhexidine (HIBICLENS) 4 % external liquid, Apply  topically to the appropriate area as directed Daily. Apply to low back, abdomen, buttocks for 5 days prior to procedure and after procedure as directed by surgeon, Disp: 236 mL, Rfl: 0    Cholecalciferol 25 MCG (1000 UT) capsule, take 1  capsule by oral route  twice a week with Vitamin D3 2,000, Disp: , Rfl:     Cholecalciferol 50 MCG (2000 UT) capsule, Take 1 capsule by mouth Daily., Disp: , Rfl:     Dietary Management Product (Rheumate) capsule, Take 1 capsule by mouth Daily., Disp: 90 capsule, Rfl: 2    estradiol (VAGIFEM) 10 MCG tablet vaginal tablet, Insert 1 tablet into the vagina Daily., Disp: , Rfl:     fluconazole (DIFLUCAN) 200 MG tablet, Take 1 tablet by mouth Daily., Disp: , Rfl:     gabapentin (NEURONTIN) 100 MG capsule, Take 1 capsule by mouth 3 (Three) Times a Day. Take 2 tablets TID (Patient taking differently: Take 2 capsules by mouth 3 (Three) Times a Day. Take 2 tablets TID), Disp: 180 capsule, Rfl: 2    Gel Base gel, 2 g 4 (Four) Times a Day. prilocaine 2%, lidocaine 10%, cyclobenzaprine 4%, capsaicin 0.001%, Meloxicam 0.1% and mannitol 20% cream, Disp: 30 g, Rfl: PRN    HYDROcodone-acetaminophen (NORCO) 5-325 MG per tablet, 1/2 TO 1 TAB QID PRN FOR SEVERE POSTOP PAIN, Disp: 12 tablet, Rfl: 0    levothyroxine (SYNTHROID, LEVOTHROID) 112 MCG tablet, Take 1 tablet by mouth Daily., Disp: , Rfl:     meclizine (ANTIVERT) 25 MG tablet, Take 1 tablet by mouth Every 8 (Eight) Hours., Disp: , Rfl:     meloxicam (MOBIC) 15 MG tablet, , Disp: , Rfl:     multivitamin with minerals tablet tablet, Centrum Silver Women 8 mg iron-400 mcg-300 mcg tablet  Take by oral route., Disp: , Rfl:     mupirocin (BACTROBAN) 2 % nasal ointment, 1 Application into the nostril(s) as directed by provider 3 (Three) Times a Day., Disp: 15 g, Rfl: 0    mupirocin (BACTROBAN) 2 % ointment, , Disp: , Rfl:     nitrofurantoin, macrocrystal-monohydrate, (MACROBID) 100 MG capsule, TAKE 1 CAPSULE BY MOUTH EVERY 12 HOURS WITH FOOD FOR 10 DAYS, Disp: , Rfl:     nortriptyline (PAMELOR) 25 MG capsule, Take 1 capsule by mouth Every Night. 1-2 TABLETS AT BEDTIME, Disp: 60 capsule, Rfl: 3    pantoprazole (PROTONIX) 20 MG EC tablet, , Disp: , Rfl:     Probiotic Product (SOLUBLE  "FIBER/PROBIOTICS PO), Take  by mouth Daily., Disp: , Rfl:     simvastatin (ZOCOR) 20 MG tablet, , Disp: , Rfl:     tiZANidine (ZANAFLEX) 2 MG tablet, 1/2 tab TO 1 tab TID PRN muscle spasms, Disp: 20 tablet, Rfl: 0      Allergies   Allergen Reactions    Hycodan [Hydrocodone Bit-Homatrop Mbr] Shortness Of Breath    Latex Swelling     Pt reports experienced \"years ago\".           Ht 157.5 cm (62\")   Wt 73.4 kg (161 lb 12.8 oz)   BMI 29.59 kg/m²       Physical Exam:  Constitutional: Patient appears well-developed, well-nourished, well-hydrated, appears younger than stated age  HEENT: Head: Normocephalic and atraumatic  Eyes: Conjunctivae and lids are normal  Pupils: Equal, round, reactive to light  Peripheral vascular exam: Femoral: right 2+, left 2+. Posterior tibialis: right 2+ and left 2+. Dorsalis pedis: right 2+ and left 2+. No edema.   Musculoskeletal   Gait and station: Gait evaluation demonstrated a normal gait. Able to walk on heels, toes, tandem walking   Lumbar Spine: Passive and active range of motion are almost full and without pain. Extension, flexion, lateral flexion, rotation of the lumbar spine did not increase or reproduce pain. Lumbar facet joint loading maneuvers are negative.   Palpation of the bilateral greater trochanters: Unrevealing   Examination of the Iliotibial band: Unrevealing   Neurological:   Patient is alert and oriented to person, place, and time.   Speech: Normal.   Cortical function: Normal mental status.   Cranial nerves 2-12: intact.   Reflex Scores:  Right patellar: 0+  Left patellar: 1+  Right Achilles: 1+  Left Achilles: 1+  Motor strength: 5/5  Motor Tone: Normal  Involuntary movements: None.   Superficial/Primitive Reflexes: Primitive reflexes were absent.   Right Mobley: Absent  Left Mobley: Absent  Right ankle clonus: Absent  Left ankle clonus: Absent   Babinsky: Absent  Long tract signs: Negative. Straight leg raising test: Negative. Femoral stretch sign: Negative. "   Sensory exam: Intact to light touch, intact pain and temperature sensation, intact vibration sensation and normal proprioception  Coordination: Finger to nose: Normal. Balance: Normal Romberg's sign: Negative   Skin and subcutaneous tissue: Skin is warm and intact. No rash noted. No cyanosis.   Psychiatric: Judgment and insight: Normal. Recent and remote memory: Intact. Mood and affect: Normal.     The surgical incision was cleansed with chlorhexidine.  Stitches were removed.  There is no erythema or drainage.    ASSESSMENT:   1. Lumbar stenosis with neurogenic claudication    2. Ligamentum flavum hypertrophy    3. Lumbar radiculopathy    4. Spondylolisthesis of lumbar region    5. Degeneration of lumbar or lumbosacral intervertebral disc    6. Spondylosis of lumbar region without myelopathy or radiculopathy    7. Physical deconditioning    8. Gait disturbance          PLAN/MEDICAL DECISION MAKING:  Ms. Renae Millan, 74 y.o. female reports a 2-year history of chronic progressive lower back and bilateral lower extremity pain.  She has been worked up for inflammatory arthritis, which has been negative. An MRI of the lumbar spine without contrast on 02/13/2023 revealed diffuse lumbar spondylosis most significant from L3-L4 through L5-S1 with multilevel disc bulges, lumbar facet hypertrophy, ligamentum flavum hypertrophy contributing to moderate degrees of canal, lateral recess, and neuroforaminal stenosis. L3-L4: Grade 1 anterolisthesis, disc bulge, degenerative endplate changes, facet hypertrophy, severe ligamentum flavum hypertrophy contributing to moderate to severe canal stenosis, moderate lateral recess stenosis and moderate left and mild right foraminal stenosis. At L4-L5: Annular disc bulge, facet hypertrophy, ligamentum flavum hypertrophy (>5 mm) contributing to moderate canal stenosis. No significant foraminal stenosis.  At L5-S1: Loss of disc height. Mild to moderate bilateral neuroforaminal stenosis, left  greater than right. On 11/08/2024,  she underwent diagnostic and therapeutic right L3-L4 and right L4-L5 transforaminal epidural steroid injections with the addition of hyaluronidase from which she has experienced 50% pain relief and functional improvement including resolution of her neurogenic claudication that lasted for about 10 weeks with progressive recurrence. She completed a physical therapy program and continues a HEP based on water therapy.  Due to nonsustained relief of minimally invasive interventional pain management procedures, and as one of the main components of her spinal stenosis is ligamentum flavum hypertrophy, she underwent a mild procedure bilaterally at L3-L4 and L4-L5 performed on 2/20/2024. She is overall reporting about a 95% improvement in her pain and symptoms.  She will keep us informed on her ongoing progress, and call us as needed.  Renae Millan failed to obtain pain relief with conservative measures including oral analgesics, topical analgesics, ice, heat, TENs, physical therapy, physical therapist directed home exercise program HEP (ongoing), therapeutic massage, independent exercise program (ongoing), to name a few. Patient has failed to respond to conservative modalities, as referenced under HPI.  Patient has responded well to minimally invasive interventional pain management measures, as referenced from previous notes and under HPI. I had a lengthy conversation with Ms. Renae Millan regarding her chronic pain condition and potential therapeutic options including risks, benefits, alternative therapies, to name a few. Therefore, I have proposed the following plan:  1. Interventional pain management measures: None indicated at this time.  She will keep us updated on her ongoing progress, and call us as needed.  There has also been discussion on Vertiflex at L3-L4 and L4-L5, PNS Sprint Dual Lead System (right posterior tibial nerve) Vs SCS Vs lumbar myelogram followed by CT  postmyelogram and repeat EMG/NCV to facilitate a referral to neurosurgery .  2. Diagnostic studies:   A. CBC, PT, PTT prior to SCS  3. Pharmacological measures: Reviewed and discussed;   A. Continue Tylenol PRN, naproxen PRN  B. Continue gabapentin 200 mg TID, she is going to attempt to titrate off medication.  C. Continue nortriptyline 25 mg 1-2 tablets at bedtime  D. Continue Rheumate one tablet once daily  E. Continue alpha lipoid acid 3924-8583 mg per day divided into 3 doses  F.  Continue prilocaine 2%, lidocaine 10%, cyclobenzaprine 4%, capsaicin 0.001% and mannitol 20% cream, apply 1 to 2 grams of cream to the affected areas every 4 to 6 hours as needed  4. Long-term rehabilitation efforts:  A. The patient does not have a history of falls.    B. Patient will start a comprehensive physical therapy program for Alter-G, water therapy, gait and balance training, neurodynamics, core strengthening, gluteal and abductor strengthening, ultrasound, ASTYM, E-STIM, myofascial release, cupping, dry needling, home exercise program  C. Contrast therapy: Apply ice-packs for 15-20 minutes, followed by heating pads for 15-20 minutes to affected area   D. Start a low impact exercise program such as water therapy, swimming, yoga, Pilates, Guy Chi, daily walks for fitness  E. Prior to SCS: Referral to Dr. Álvaro Sanz for psychological screening for spinal cord stimulation and intrathecal therapies.  F. Renae Millan  reports that she has never smoked. She has never used smokeless tobacco.  5. The patient has been instructed to contact my office with any questions or difficulties. The patient understands the plan and agrees to proceed accordingly.      Pain Medications               gabapentin (NEURONTIN) 100 MG capsule Take 1 capsule by mouth 3 (Three) Times a Day. Take 2 tablets TID    HYDROcodone-acetaminophen (NORCO) 5-325 MG per tablet 1/2 TO 1 TAB QID PRN FOR SEVERE POSTOP PAIN    meloxicam (MOBIC) 15 MG tablet      nortriptyline (PAMELOR) 25 MG capsule Take 1 capsule by mouth Every Night. 1-2 TABLETS AT BEDTIME    tiZANidine (ZANAFLEX) 2 MG tablet 1/2 tab TO 1 tab TID PRN muscle spasms             No orders of the defined types were placed in this encounter.         Please note that portions of this note were completed with a voice recognition program.     ALIA Johnson    Patient Care Team:  Babak Dixon MD as PCP - General (Family Medicine)  Ziggy Moscoso MD as Consulting Physician (Pain Medicine)     No orders of the defined types were placed in this encounter.        No future appointments.

## 2024-07-29 DIAGNOSIS — M48.062 LUMBAR STENOSIS WITH NEUROGENIC CLAUDICATION: ICD-10-CM

## 2024-07-29 RX ORDER — ME-TETRAHYDROFOLATE/B12/HRB236 1-1-500 MG
1 CAPSULE ORAL DAILY
Qty: 90 CAPSULE | Refills: 2 | Status: SHIPPED | OUTPATIENT
Start: 2024-07-29

## 2025-02-14 ENCOUNTER — TREATMENT (OUTPATIENT)
Dept: PHYSICAL THERAPY | Facility: CLINIC | Age: 75
End: 2025-02-14
Payer: MEDICARE

## 2025-02-14 ENCOUNTER — TRANSCRIBE ORDERS (OUTPATIENT)
Dept: PHYSICAL THERAPY | Facility: CLINIC | Age: 75
End: 2025-02-14
Payer: MEDICARE

## 2025-02-14 DIAGNOSIS — M25.531 RIGHT WRIST PAIN: ICD-10-CM

## 2025-02-14 DIAGNOSIS — M18.11 PRIMARY OSTEOARTHRITIS OF FIRST CARPOMETACARPAL JOINT OF RIGHT HAND: Primary | ICD-10-CM

## 2025-02-14 DIAGNOSIS — Z96.698 S/P FINGER JOINT REPLACEMENT: Primary | ICD-10-CM

## 2025-02-14 DIAGNOSIS — Z47.89 ORTHOPEDIC AFTERCARE: ICD-10-CM

## 2025-02-14 NOTE — LETTER
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 S/P 1st CMC arthroplasty.  They have been sent to PT for post-operative splinting.  Medical/Vocational History/ Medications:  bank/teller line    Pain: Minimal discomfort    edema: Mild plus  Sensibility: WNL   Wound Status: Surgical wound with eschar healing well.  ROM/ Strength: Not assessed due to surgical precautions.    Splinting:  Patient was measure and fit with a custom fabricated volar forearm based wrist/hand/thumb immobilization splint with IP joint free.   Patient was instructed in wearing schedule, precautions and care of the splint during this visit.   Patient was instructed in proper donning/doffing of splint.   Assessment:  Patient was fitted and appropriate splint was fabricated this date.  Patient reported that splint was comfortable and had no complications with the fit of the splint.  Patient was instructed and patient verbalized understanding of precautions, wear and care of the splint.   Patient demonstrated independent donning/doffing of splint during treatment today.      NAME: Renae Guajardodaisy ROSASB.: 1950    DATE: 2025  Goals:  Patient was fitted properly with appropriate splint for diagnosis  Patient was educated on precautions, wear schedule and care of splint  Patient demonstrated independence with donning/doffing of the splint.  Splint was provided to Protect Healing Structures, Restrict Mobility, Improve joint alignment.  Plan:  No additional treatment is required for this patient at this time. The patient is therefore discharged from therapy.  Patient advised to contact therapist with any additional questions or concerns regarding  the fit and function of the splint.  Patient will be seen for splint issues as needed   Wear Instructions: Off for hygiene       PT: Temo Garces PT, T    License Number: [id number]  Electronically signed by Temo Garces PT, 02/14/25, 9:45 PM EST    Certification Period: 2/14/2025 thru 5/14/2025  I certify that the therapy services are furnished while this patient is under my care.  The services outlined above are required by this patient, and will be reviewed every 90 days.     Physician Signature:__________________________________________________  Marya Banuelos MD                                     DATE:_______________________________________________________________      Please sign and return via fax to [phone]. Thank you, [facility].

## 2025-02-17 NOTE — PROGRESS NOTES
[facility]  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 S/P 1st CMC arthroplasty.  They have been sent to PT for post-operative splinting.  Medical/Vocational History/ Medications:  bank/teller line    Pain: Minimal discomfort    edema: Mild plus  Sensibility: WNL   Wound Status: Surgical wound with eschar healing well.  ROM/ Strength: Not assessed due to surgical precautions.    Splinting:  Patient was measure and fit with a custom fabricated volar forearm based wrist/hand/thumb immobilization splint with IP joint free.   Patient was instructed in wearing schedule, precautions and care of the splint during this visit.   Patient was instructed in proper donning/doffing of splint.   Assessment:  Patient was fitted and appropriate splint was fabricated this date.  Patient reported that splint was comfortable and had no complications with the fit of the splint.  Patient was instructed and patient verbalized understanding of precautions, wear and care of the splint.   Patient demonstrated independent donning/doffing of splint during treatment today.  Goals:  Patient was fitted properly with appropriate splint for diagnosis  Patient was educated on precautions, wear schedule and care of splint  Patient demonstrated independence with donning/doffing of the splint.  Splint was provided to Protect Healing Structures, Restrict Mobility, Improve joint alignment.  Plan:  No additional treatment is required for this patient at this time. The patient is therefore discharged from therapy.  Patient advised to contact therapist with any additional questions or concerns regarding the fit and function of the  splint.  Patient will be seen for splint issues as needed   Wear Instructions: Off for hygiene       PT: Temo Garces PT, T    License Number: [id number]  Electronically signed by Temo Garces PT, 02/14/25, 9:45 PM EST    Certification Period: 2/14/2025 thru 5/14/2025  I certify that the therapy services are furnished while this patient is under my care.  The services outlined above are required by this patient, and will be reviewed every 90 days.     Physician Signature:__________________________________________________  Marya Banuelos MD                                     DATE:_______________________________________________________________      Please sign and return via fax to [phone]. Thank you, [facility].

## 2025-03-29 DIAGNOSIS — M48.062 LUMBAR STENOSIS WITH NEUROGENIC CLAUDICATION: ICD-10-CM

## 2025-03-31 RX ORDER — ME-TETRAHYDROFOLATE/B12/HRB236 1-1-500 MG
1 CAPSULE ORAL DAILY
Qty: 90 CAPSULE | Refills: 2 | Status: SHIPPED | OUTPATIENT
Start: 2025-03-31

## 2025-04-21 ENCOUNTER — HOSPITAL ENCOUNTER (OUTPATIENT)
Dept: HOSPITAL 22 - OT | Age: 75
Discharge: HOME | End: 2025-04-21
Payer: MEDICARE

## 2025-04-21 DIAGNOSIS — Z96.691: Primary | ICD-10-CM

## 2025-04-21 PROCEDURE — 97014 ELECTRIC STIMULATION THERAPY: CPT

## 2025-04-21 PROCEDURE — 97018 PARAFFIN BATH THERAPY: CPT

## 2025-04-21 PROCEDURE — 97140 MANUAL THERAPY 1/> REGIONS: CPT

## 2025-04-21 PROCEDURE — G0283 ELEC STIM OTHER THAN WOUND: HCPCS

## 2025-04-21 PROCEDURE — 97110 THERAPEUTIC EXERCISES: CPT

## 2025-04-21 PROCEDURE — 97530 THERAPEUTIC ACTIVITIES: CPT

## 2025-04-21 PROCEDURE — 97035 APP MDLTY 1+ULTRASOUND EA 15: CPT
